# Patient Record
Sex: MALE | Employment: FULL TIME | URBAN - METROPOLITAN AREA
[De-identification: names, ages, dates, MRNs, and addresses within clinical notes are randomized per-mention and may not be internally consistent; named-entity substitution may affect disease eponyms.]

---

## 2017-01-17 ENCOUNTER — OFFICE VISIT (OUTPATIENT)
Dept: NEUROLOGY | Facility: CLINIC | Age: 37
End: 2017-01-17

## 2017-01-17 VITALS
WEIGHT: 215 LBS | DIASTOLIC BLOOD PRESSURE: 76 MMHG | SYSTOLIC BLOOD PRESSURE: 118 MMHG | HEIGHT: 72 IN | HEART RATE: 69 BPM | BODY MASS INDEX: 29.12 KG/M2

## 2017-01-17 DIAGNOSIS — G40.309 GENERALIZED CONVULSIVE EPILEPSY (H): ICD-10-CM

## 2017-01-17 DIAGNOSIS — G40.309 EPILEPTIC SEIZURE, GENERALIZED, CONVULSIVE (H): Primary | ICD-10-CM

## 2017-01-17 RX ORDER — LEVETIRACETAM 500 MG/1
1000 TABLET ORAL 2 TIMES DAILY
Qty: 360 TABLET | Refills: 3 | Status: SHIPPED | OUTPATIENT
Start: 2017-01-17 | End: 2018-01-29

## 2017-01-17 ASSESSMENT — ENCOUNTER SYMPTOMS
TROUBLE SWALLOWING: 0
SINUS CONGESTION: 1
NECK MASS: 0
SINUS PAIN: 1
SORE THROAT: 1
SMELL DISTURBANCE: 0
TASTE DISTURBANCE: 0
HOARSE VOICE: 0

## 2017-01-17 ASSESSMENT — PAIN SCALES - GENERAL: PAINLEVEL: NO PAIN (0)

## 2017-01-17 NOTE — MR AVS SNAPSHOT
After Visit Summary   1/17/2017    Sae Yip    MRN: [id number]           Patient Information     Date Of Birth          1980        Visit Information        Provider Department      1/17/2017 8:00 AM Zia Chiu MD [facility] Neurology        Today's Diagnoses     Epileptic seizure, generalized, convulsive (H)    -  1     Generalized convulsive epilepsy (H)            Follow-ups after your visit        Follow-up notes from your care team     Return in about 1 year (around 1/17/2018).      Your next 10 appointments already scheduled     Jan 17, 2017  9:30 AM   LAB with  LAB   [facility] Lab ([facility])    [address] [place] [address] [id number] [phone]           Patient must bring picture ID.  Patient should be prepared to give a urine specimen  Please do not eat 10-12 hours before your appointment if you are coming in fasting for labs on lipids, cholesterol, or glucose (sugar).  Pregnant women should follow their Care Team instructions. Water with medications is okay. Do not drink coffee or other fluids.   If you have concerns about taking  your medications, please ask at office or if scheduling via [employer], send a message by clicking on Secure Messaging, Message Your Care Team.            Jan 16, 2018  8:00 AM   (Arrive by 7:45 AM)   Return Seizure with Zia Chiu MD   [facility] Neurology ([facility])    [address] [place] [id number] [phone]              Future tests that were ordered for you today     Open Future Orders        Priority Expected Expires Ordered    Levetiracetam level Routine  1/17/2018 1/17/2017            Who to contact     Please call your clinic at [phone] to:    Ask questions about your health    Make or cancel appointments    Discuss your medicines    Learn about your test results    Speak to your doctor   If you have compliments or  concerns about an experience at your clinic, or if you wish to file a complaint, please contact [place] [facility] Patient Relations at [phone] or email us at [email or web address]         Additional Information About Your Visit        [employer] Information     [employer] is an electronic gateway that provides easy, online access to your medical records. With [employer], you can request a clinic appointment, read your test results, renew a prescription or communicate with your care team.     To sign up for [employer] visit the website at [email or web address]   You will be asked to enter the access code listed below, as well as some personal information. Please follow the directions to create your username and password.     Your access code is: [email or web address]  Expires: 4/3/2017  6:30 AM     Your access code will  in 90 days. If you need help or a new code, please contact your [facility] or call [phone] for assistance.        Care EveryWhere ID     This is your Care EveryWhere ID. This could be used by other organizations to access your [facility] medical records  [id number]        Your Vitals Were     Pulse Height BMI (Body Mass Index)             69 1.829 m (6') 29.15 kg/m2          Blood Pressure from Last 3 Encounters:   17 118/76   01/13/15 126/76   14 108/63    Weight from Last 3 Encounters:   17 97.523 kg (215 lb)   01/13/15 95.255 kg (210 lb)   14 92.534 kg (204 lb)                 Where to get your medicines      These medications were sent to [facility] [id number] [facility] [place] - [address]  [address] [address]     Phone:  [phone]    - levETIRAcetam 500 MG tablet       Primary Care Provider    Physician No Ref-Primary       No address on file        Thank you!     Thank you for choosing [facility] NEUROLOGY  for your care. Our goal is always to provide you with excellent care. Hearing back from our  patients is one way we can continue to improve our services. Please take a few minutes to complete the written survey that you may receive in the mail after your visit with us. Thank you!             Your Updated Medication List - Protect others around you: Learn how to safely use, store and throw away your medicines at www.disposemymeds.org.          This list is accurate as of: 1/17/17  9:27 AM.  Always use your most recent med list.                   Brand Name Dispense Instructions for use    levETIRAcetam 500 MG tablet    KEPPRA    360 tablet    Take 2 tablets (1,000 mg) by mouth 2 times daily

## 2017-01-17 NOTE — PROGRESS NOTES
[facility] Neurology Epilepsy Clinic: RETURN VISIT     HISTORY: Mr. 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  He came alone to the visit today.      The patient reported that he has not had any type of seizure or jerks while awake, following his most recent visit to this clinic on 01/21/2016.  The most recent seizure was a grand mal seizure in 2013.  He had an electroencephalogram in 2016, which continued to show generalized spike-wave discharges, and today he remembered or telephone discussion of this.  He denied having any adverse effects of levetiracetam.      Ictal semiology-history:   The patient re-confirmed previously presented history today.  Previously he reported that his most recent seizure occurred in [place] on March 30, 2013, when he had been drinking alcohol and having reduced sleep for several days prior to the seizure event which occurred on Saturday in the afternoon at approximately 4:00 p.m.  The seizure was witnessed by several of his friends in the hotel room where they described that he had a loud grunting sound produced followed by a generalized tonic-clonic convulsion which appeared to last for approximately 1-2 minutes followed by a several hour postictal state of confusion and lethargy. The patient had bitten his tongue on the left lower side. There was no urine incontinence. He was taken to an emergency room in [place] where he underwent a CAT scan which was normal and was discharged on Keppra 500 mg twice daily.     As per his history the patient states that at the age of 21 he experienced a similar event associated with the same surrounding circumstances such as drinking alcohol and having a lack of sleep. 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 The patient states that he was totally alert and conscious during this period but felt that his perception of events were somewhat altered. He states that these were his only 2 generalized tonic-clonic convulsions that he has had throughout his life. He denies ever having convulsions prior to these 2.     Epilepsy-seizure predispositions:   He denied any history of childhood febrile seizures, denies any history of childhood myoclonic jerking activity, particularly in the morning hours or the evening hours prior to going to sleep. He denied any history of absence episodes or staring episodes or difficulties with school as a child.     The patient has no family history of epilepsy or seizures, except for a great aunt who did have epilepsy; he does not know details of her seizure history.     Developmentally he hit his milestones at appropriate times during his childhood.  He has no history of gestational or  injury, febrile convulsions, developmental delay, stroke, meningitis, encephalitis, significant head injury, or other epileptic predispositions. He denied a history of physical or sexual abuse by an adult during his childhood or adulthood.     He does state that for approximately 6 months to 1 year after having his first generalized tonic-clonic convulsion at the age of 21 that he had had these periods lasting anywhere from 4-6 hours of confusion and \"not having mind work correctly\". He cannot describe the details of these events and he denies ever having loss of consciousness with any of these events, but states that when asked to perform complex tasks during this time period he just is unable to do so. He gives references to knowing that a task should be performed at a certain time and realizing that this task was not " performed at that time and unclear as to why he was not able to do that.     Laboratory evaluations:   He previously reported that when he was 21 years old he did have a neurologic evaluation which included an MRI scan as well as an EEG which did reveal abnormalities on the EEG which appeared to be described as possible left temporal sharp wave discharges, which were reported as  not definitive . His MRI scan was reported to be normal, although these records are unavailable for review at this time.     On April 8, 2013, at [facility], a brain MRI was abnormal due to a left frontal lobe subcortical white matter abnormality c/w gliosis.     On May 30, 2013, at [facility], a 24-hour ambulatory EEG recording showed frequent 1-6 second discharges of 3 Hz generalized spike-wave activity, mainly in drowsiness, with no seizures.     On February16, 2016, at [facility], a prolonged outpatient EEG recording showed occasional brief bursts of generalized atypical spike-wave discharges, mainly during slow wave sleep.     Epilepsy therapeutics:   His only epilepsy therapy has been levetiracetam.    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 He lives with his wife and their daughter. He uses alcohol as 1-2 drinks 1-2 times per week. He denies using illicit drugs.     REVIEW OF SYSTEMS: The patient denied history of attention and memory disturbance, difficulties with comprehension and expression, difficulty in solving problems, weakness, tremors or other abnormal involuntary movements, numbness or tingling, incoordination, falling or difficulty in walking, urinary or fecal incontinence, headache and other pain, except as described above. The patient denied any history of severe depression or suicidal ideation, severe anxiety, panic attacks, hallucinations,  delusions, psychosis, substance abuse, or psychiatric hospitalization. He denied rashes and easy bruising. The remainder of a 10-system symptom review was negative.     MEDICATIONS: Levetiracetam 1000 mg b.i.d.     ALLERGIES: The patient denied any known medication allergies.     PHYSICAL EXAMINATION:   On physical examination the patient appeared to be in no acute distress.  Vital signs were as per the electronic medical record.  Skull was normocephalic and atraumatic.  Neck was supple, without signs of meningeal irritation.      On neurological examination the patient appeared alert and was fully oriented to person, place, time, and reason for visit.  Speech showed grossly normal articulation, fluency, repetitions, naming, syntax and comprehension.  No apraxias or atavistic signs were elicited.  Cranial nerves III through XII were normal.  Muscle masses, tones and strengths were normal throughout.  There was no pronator drift.  No tremors, myoclonus, or other abnormal movements were observed.  Sensations of light touch, pin prick, vibration and proprioception were reportedly normal throughout.  The rapid alternating movements, and finger-nose-finger and heel-shin maneuvers were performed normally bilaterally.  Deep tendon reflexes were normal and symmetric throughout.  Toes were downgoing bilaterally.  Romberg maneuver was negative.  Regular, tandem and reverse tandem walking were normal.      IMPRESSION:   The patient has evidence of idiopathic generalized epilepsy by history and electroencephalography.  Although he has not had a grand mal seizure for nearly 4 years, his EEG of approximately 10 months ago continued to show brief bursts of generalized atypical spike-wave discharges.  We discussed the ongoing high risk of seizure recurrence should he stop anti-seizure medication.  He has no interest in doing so.     I again reviewed [place] regulations on seizures and driving with the patient. He appeared to  clearly understand that he would be prohibited from operating a motor vehicle within 3 months following any future seizure or other episode with sudden unconsciousness or inability to sit up, and that he is required to report any future such seizure to the [facility] within 30 days after the event. I also recommended that he and his family review all of his other activities, and avoid any activities that might lead to self-injury or injury of others, within 6 months following any seizure with impaired awareness or impaired motor control. Such activities include but are not limited to holding babies or young children at heights from which they might be injured if dropped, bathing infants or young children in situations in which they might drown without continuous interactive care by an adult who is fully capable at all times during the bath, operating power cutting or other tools, handling firearms, exposure to heights from which he might fall, exposure to vessels with hot cooking oil or water, and swimming alone.     PLAN:   1.  Serum levetiracetam level today.   2.  Continue levetiracetam at 1000 mg b.i.d.   3.  Return visit in approximately 1 year.   I spent 25 minutes in this patient care, more than 50% of which consisted of counseling and coordinating care.       Zia Chiu M.D.   Professor of Neurology    D: 2017 09:32   T: 2017 12:33   MT: DANAY      Name:     TIFFANIE MENEZES   MRN:      [id number]        Account:      [id number]   :      1980           Service Date: 2017      Document: [id number]

## 2017-01-17 NOTE — Clinical Note
1/17/2017       RE: Sae Yip  [address] [facility] [address]     Dear Colleague,    Thank you for referring your patient, Sae Yip, to the [facility] NEUROLOGY at [facility]. Please see a copy of my visit note below.      [facility] Neurology Epilepsy Clinic: RETURN VISIT     HISTORY: Mr. 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  He came alone to the visit today.      The patient reported that he has not had any type of seizure or jerks while awake, following his most recent visit to this clinic on 01/21/2016.  The most recent seizure was a grand mal seizure in 2013.  He had an electroencephalogram in 2016, which continued to show generalized spike-wave discharges, and today he remembered or telephone discussion of this.  He denied having any adverse effects of levetiracetam.      Ictal semiology-history:   The patient re-confirmed previously presented history today.  Previously he reported that his most recent seizure occurred in [place] on March 30, 2013, when he had been drinking alcohol and having reduced sleep for several days prior to the seizure event which occurred on Saturday in the afternoon at approximately 4:00 p.m.  The seizure was witnessed by several of his friends in the hotel room where they described that he had a loud grunting sound produced followed by a generalized tonic-clonic convulsion which appeared to last for approximately 1-2 minutes followed by a several hour postictal state of confusion and lethargy. The patient had bitten his tongue on the left lower side. There was no urine incontinence. He was taken to an emergency room in [place] where he underwent a CAT scan which was normal and was discharged on Keppra 500 mg twice daily.     As per his history the patient states that at the age of 21 he experienced a similar event  "associated with the same surrounding circumstances such as drinking alcohol and having a lack of sleep. 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 The patient states that he was totally alert and conscious during this period but felt that his perception of events were somewhat altered. He states that these were his only 2 generalized tonic-clonic convulsions that he has had throughout his life. He denies ever having convulsions prior to these 2.     Epilepsy-seizure predispositions:   He denied any history of childhood febrile seizures, denies any history of childhood myoclonic jerking activity, particularly in the morning hours or the evening hours prior to going to sleep. He denied any history of absence episodes or staring episodes or difficulties with school as a child.     The patient has no family history of epilepsy or seizures, except for a great aunt who did have epilepsy; he does not know details of her seizure history.     Developmentally he hit his milestones at appropriate times during his childhood.  He has no history of gestational or  injury, febrile convulsions, developmental delay, stroke, meningitis, encephalitis, significant head injury, or other epileptic predispositions. He denied a history of physical or sexual abuse by an adult during his childhood or adulthood.     He does state that for approximately 6 months to 1 year after having his first generalized tonic-clonic convulsion at the age of 21 that he had had these periods lasting anywhere from 4-6 hours of confusion and \"not having mind work correctly\". He cannot describe the details of these events and he denies " ever having loss of consciousness with any of these events, but states that when asked to perform complex tasks during this time period he just is unable to do so. He gives references to knowing that a task should be performed at a certain time and realizing that this task was not performed at that time and unclear as to why he was not able to do that.     Laboratory evaluations:   He previously reported that when he was 21 years old he did have a neurologic evaluation which included an MRI scan as well as an EEG which did reveal abnormalities on the EEG which appeared to be described as possible left temporal sharp wave discharges, which were reported as  not definitive . His MRI scan was reported to be normal, although these records are unavailable for review at this time.     On April 8, 2013, at [facility], a brain MRI was abnormal due to a left frontal lobe subcortical white matter abnormality c/w gliosis.     On May 30, 2013, at [facility], a 24-hour ambulatory EEG recording showed frequent 1-6 second discharges of 3 Hz generalized spike-wave activity, mainly in drowsiness, with no seizures.     On February16, 2016, at [facility], a prolonged outpatient EEG recording showed occasional brief bursts of generalized atypical spike-wave discharges, mainly during slow wave sleep.     Epilepsy therapeutics:   His only epilepsy therapy has been levetiracetam.    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 He lives with his wife and their daughter. He uses alcohol as 1-2 drinks 1-2 times per week. He denies using illicit drugs.     REVIEW OF SYSTEMS: The patient denied history of attention and memory disturbance, difficulties with comprehension and expression, difficulty in solving problems, weakness, tremors or other abnormal involuntary movements,  numbness or tingling, incoordination, falling or difficulty in walking, urinary or fecal incontinence, headache and other pain, except as described above. The patient denied any history of severe depression or suicidal ideation, severe anxiety, panic attacks, hallucinations, delusions, psychosis, substance abuse, or psychiatric hospitalization. He denied rashes and easy bruising. The remainder of a 10-system symptom review was negative.     MEDICATIONS: Levetiracetam 1000 mg b.i.d.     ALLERGIES: The patient denied any known medication allergies.     PHYSICAL EXAMINATION:   On physical examination the patient appeared to be in no acute distress.  Vital signs were as per the electronic medical record.  Skull was normocephalic and atraumatic.  Neck was supple, without signs of meningeal irritation.      On neurological examination the patient appeared alert and was fully oriented to person, place, time, and reason for visit.  Speech showed grossly normal articulation, fluency, repetitions, naming, syntax and comprehension.  No apraxias or atavistic signs were elicited.  Cranial nerves III through XII were normal.  Muscle masses, tones and strengths were normal throughout.  There was no pronator drift.  No tremors, myoclonus, or other abnormal movements were observed.  Sensations of light touch, pin prick, vibration and proprioception were reportedly normal throughout.  The rapid alternating movements, and finger-nose-finger and heel-shin maneuvers were performed normally bilaterally.  Deep tendon reflexes were normal and symmetric throughout.  Toes were downgoing bilaterally.  Romberg maneuver was negative.  Regular, tandem and reverse tandem walking were normal.      IMPRESSION:   The patient has evidence of idiopathic generalized epilepsy by history and electroencephalography.  Although he has not had a grand mal seizure for nearly 4 years, his EEG of approximately 10 months ago continued to show brief bursts of  generalized atypical spike-wave discharges.  We discussed the ongoing high risk of seizure recurrence should he stop anti-seizure medication.  He has no interest in doing so.     I again reviewed [place] regulations on seizures and driving with the patient. He appeared to clearly understand that he would be prohibited from operating a motor vehicle within 3 months following any future seizure or other episode with sudden unconsciousness or inability to sit up, and that he is required to report any future such seizure to the [facility] within 30 days after the event. I also recommended that he and his family review all of his other activities, and avoid any activities that might lead to self-injury or injury of others, within 6 months following any seizure with impaired awareness or impaired motor control. Such activities include but are not limited to holding babies or young children at heights from which they might be injured if dropped, bathing infants or young children in situations in which they might drown without continuous interactive care by an adult who is fully capable at all times during the bath, operating power cutting or other tools, handling firearms, exposure to heights from which he might fall, exposure to vessels with hot cooking oil or water, and swimming alone.     PLAN:   1.  Serum levetiracetam level today.   2.  Continue levetiracetam at 1000 mg b.i.d.   3.  Return visit in approximately 1 year.   I spent 25 minutes in this patient care, more than 50% of which consisted of counseling and coordinating care.       Zia Chiu M.D.   Professor of Neurology    D: 2017 09:32   T: 2017 12:33   MT: DANAY      Name:     TIFFANIE MENEZES   MRN:      [id number]        Account:      [id number]   :      1980           Service Date: 2017      Document: [id number]

## 2017-01-17 NOTE — NURSING NOTE
Chief Complaint   Patient presents with     RECHECK     [facility] RETURN SEIZURE     Maria Elena Oneill MA

## 2017-08-03 ENCOUNTER — TELEPHONE (OUTPATIENT)
Dept: NEUROLOGY | Facility: CLINIC | Age: 37
End: 2017-08-03

## 2017-08-03 NOTE — TELEPHONE ENCOUNTER
DMV form signed, faxed to [employer] on 8/3/17, sent to scanning, and copy mailed to patient.     Tawny Reis CMA

## 2018-01-29 ENCOUNTER — OFFICE VISIT (OUTPATIENT)
Dept: NEUROLOGY | Facility: CLINIC | Age: 38
End: 2018-01-29
Payer: COMMERCIAL

## 2018-01-29 ENCOUNTER — APPOINTMENT (OUTPATIENT)
Dept: LAB | Facility: CLINIC | Age: 38
End: 2018-01-29
Payer: COMMERCIAL

## 2018-01-29 VITALS
DIASTOLIC BLOOD PRESSURE: 52 MMHG | SYSTOLIC BLOOD PRESSURE: 132 MMHG | OXYGEN SATURATION: 99 % | WEIGHT: 212.7 LBS | HEART RATE: 55 BPM | HEIGHT: 71 IN | RESPIRATION RATE: 20 BRPM | BODY MASS INDEX: 29.78 KG/M2 | TEMPERATURE: 98.1 F

## 2018-01-29 DIAGNOSIS — G40.309 GENERALIZED CONVULSIVE EPILEPSY (H): ICD-10-CM

## 2018-01-29 DIAGNOSIS — G40.309 EPILEPTIC SEIZURE, GENERALIZED, CONVULSIVE (H): Primary | ICD-10-CM

## 2018-01-29 RX ORDER — LEVETIRACETAM 500 MG/1
1000 TABLET ORAL 2 TIMES DAILY
Qty: 360 TABLET | Refills: 3 | Status: SHIPPED | OUTPATIENT
Start: 2018-01-29 | End: 2018-10-01

## 2018-01-29 ASSESSMENT — PAIN SCALES - GENERAL: PAINLEVEL: NO PAIN (0)

## 2018-01-29 NOTE — PROGRESS NOTES
[facility] Neurology Epilepsy Clinic: RETURN VISIT     HISTORY: Mr. 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  He came to the visit today alone.      The patient reported that following the most recent visit to this clinic on 01/17/2017, he has not had any grand mal seizures or any sort of event with falling or loss of consciousness.  He has had some hypnic jerks when he is clearly drowsy, but he has not had any jerks while awake.  He denied having adverse effects of levetiracetam.      Ictal semiology-history:   The patient re-confirmed previously presented history today.  Previously he reported that his most recent seizure occurred in [place] on March 30, 2013, when he had been drinking alcohol and having reduced sleep for several days prior to the seizure event which occurred on Saturday in the afternoon at approximately 4:00 p.m.  The seizure was witnessed by several of his friends in the hotel room where they described that he had a loud grunting sound produced followed by a generalized tonic-clonic convulsion which appeared to last for approximately 1-2 minutes followed by a several hour postictal state of confusion and lethargy. The patient had bitten his tongue on the left lower side. There was no urine incontinence. He was taken to an emergency room in [place] where he underwent a CAT scan which was normal and was discharged on Keppra 500 mg twice daily.     As per his history the patient states that at the age of 21 he experienced a similar event associated with the same surrounding circumstances such as drinking alcohol and having a lack of sleep. 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 The patient states that he was totally alert and conscious during this period but felt that his perception of events were somewhat altered. He states that these were his only 2 generalized tonic-clonic convulsions that he has had throughout his life. He denies ever having convulsions prior to these 2.     Epilepsy-seizure predispositions:   He denied any history of childhood febrile seizures, denies any history of childhood myoclonic jerking activity, particularly in the morning hours or the evening hours prior to going to sleep. He denied any history of absence episodes or staring episodes or difficulties with school as a child.     The patient has no family history of epilepsy or seizures, except for a great aunt who did have epilepsy; he does not know details of her seizure history.     Developmentally he hit his milestones at appropriate times during his childhood.  He has no history of gestational or  injury, febrile convulsions, developmental delay, stroke, meningitis, encephalitis, significant head injury, or other epileptic predispositions. He denied a history of physical or sexual abuse by an adult during his childhood or adulthood.     He does state that for approximately 6 months to 1 year after having his first generalized tonic-clonic convulsion at the age of 21 that he had had these periods lasting anywhere from 4-6 hours of confusion and \"not having mind work correctly\". He cannot describe the details of these events and he denies ever having loss of consciousness with any of these events, but states that when asked to perform complex tasks during this time period he just is unable to do so. He gives references to knowing that a task should be performed at a certain time and realizing that this task was not performed at that time and unclear as to why he was not able to do that. "     Laboratory evaluations:   He previously reported that when he was 21 years old he did have a neurologic evaluation which included an MRI scan as well as an EEG which did reveal abnormalities on the EEG which appeared to be described as possible left temporal sharp wave discharges, which were reported as  not definitive . His MRI scan was reported to be normal, although these records are unavailable for review at this time.     On April 8, 2013, at [facility], a brain MRI was abnormal due to a left frontal lobe subcortical white matter abnormality c/w gliosis.     On May 30, 2013, at [facility], a 24-hour ambulatory EEG recording showed frequent 1-6 second discharges of 3 Hz generalized spike-wave activity, mainly in drowsiness, with no seizures.     On February16, 2016, at [facility], a prolonged outpatient EEG recording showed occasional brief bursts of generalized atypical spike-wave discharges, mainly during slow wave sleep.     Epilepsy therapeutics:   His only epilepsy therapy has been levetiracetam.    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 He lives with his wife and their daughter. He uses alcohol as 1-2 drinks 1-2 times per week. He denies using illicit drugs.     REVIEW OF SYSTEMS: The patient denied history of attention and memory disturbance, difficulties with comprehension and expression, difficulty in solving problems, weakness, tremors or other abnormal involuntary movements, numbness or tingling, incoordination, falling or difficulty in walking, urinary or fecal incontinence, headache and other pain, except as described above. The patient denied any history of severe depression or suicidal ideation, severe anxiety, panic attacks, hallucinations, delusions, psychosis, substance abuse, or psychiatric hospitalization. He  "denied rashes and easy bruising. The remainder of a 10-system symptom review was negative.     MEDICATIONS: Levetiracetam 1000 mg b.i.d.     ALLERGIES: The patient denied any known medication allergies.     PHYSICAL EXAMINATION:   On physical examination the patient appeared to be in no acute distress.  Vital signs were as per the electronic medical record.  Skull was normocephalic and atraumatic.  Neck was supple, without signs of meningeal irritation.      On neurological examination the patient appeared alert and was fully oriented to person, place, time, and reason for visit.  Speech showed grossly normal articulation, fluency, repetitions, naming, syntax and comprehension.  No apraxias or atavistic signs were elicited.  Cranial nerves III through XII were normal.  Muscle masses, tones and strengths were normal throughout.  There was no pronator drift.  No tremors, myoclonus, or other abnormal movements were observed.  Sensations of light touch, pin prick, vibration and proprioception were reportedly normal throughout.  The rapid alternating movements, and finger-nose-finger and heel-shin maneuvers were performed normally bilaterally.  Deep tendon reflexes were normal and symmetric throughout.  Toes were downgoing bilaterally.  Romberg maneuver was negative.  Regular, tandem and reverse tandem walking were normal.      IMPRESSION:   The patient remains seizure-free on levetiracetam monotherapy.  We will check a serum level today and also an outpatient EEG and adjust dosing as needed to maintain a markedly reduced risk of seizure recurrence.      I discussed in detail strategies to be certain that he does not miss doses of levetiracetam, as he has  a difficult travel schedule in his work and he thinks that he may \"occasionally\" miss a dose of levetiracetam.     I once again reviewed [place] regulations on seizures and driving with the patient. He appeared to clearly understand that he would be prohibited from " operating a motor vehicle within 3 months following any future seizure or other episode with sudden unconsciousness or inability to sit up, and that he is required to report any future such seizure to the V within 30 days after the event. I also recommended that he and his family review all of his other activities, and avoid any activities that might lead to self-injury or injury of others, within 6 months following any seizure with impaired awareness or impaired motor control. Such activities include but are not limited to holding babies or young children at heights from which they might be injured if dropped, bathing infants or young children in situations in which they might drown without continuous interactive care by an adult who is fully capable at all times during the bath, operating power cutting or other tools, handling firearms, exposure to heights from which he might fall, exposure to vessels with hot cooking oil or water, and swimming alone.     PLAN:   1.  Check serum levetiracetam level today.   2.  Continue levetiracetam at 1000 mg b.i.d.   3.  Outpatient 3-hour video EEG.   4.  Return visit in approximately 11 months.     I spent 25 minutes in this patient care, more than 50% of which consisted of counseling and coordinating care.       Zia Chiu M.D.   Professor of Neurology        D: 2018   T: 2018   MT: nh      Name:     TIFFANIE MENEZES   MRN:      [id number]        Account:      [id number]   :      1980           Service Date: 2018      Document: [id number]

## 2018-01-29 NOTE — MR AVS SNAPSHOT
After Visit Summary   1/29/2018    Sae Yip    MRN: [id number]           Patient Information     Date Of Birth          1980        Visit Information        Provider Department      1/29/2018 2:30 PM Zia Chiu MD [facility] Neurology        Today's Diagnoses     Epileptic seizure, generalized, convulsive (H)    -  1    Generalized convulsive epilepsy (H)           Follow-ups after your visit        Follow-up notes from your care team     Return in about 11 months (around 12/29/2018).      Your next 10 appointments already scheduled     Jan 29, 2018  3:45 PM CST   LAB with  LAB   [facility] ([facility])    [address] [place] [address] [id number] [phone]           Please do not eat 10-12 hours before your appointment if you are coming in fasting for labs on lipids, cholesterol, or glucose (sugar). This does not apply to pregnant women. Water, hot tea and black coffee (with nothing added) are okay. Do not drink other fluids, diet soda or chew gum.            Feb 21, 2018  8:30 AM CST   (Arrive by 8:15 AM)   In Lab Video Visit with  EEG TECH 2   [facility] ([facility])    [address] [place] [address] [id number] [phone]            Dec 17, 2018  2:30 PM CST   (Arrive by 2:15 PM)   Return Seizure with Zia Chiu MD   [facility] ([facility])    [address] [place] [address] [id number] [phone]              Future tests that were ordered for you today     Open Future Orders        Priority Expected Expires Ordered    EEG video monitoring Routine 1/30/2018 1/29/2019 1/29/2018            Who to contact     Please call your clinic at [phone] to:    Ask questions about your health    Make or cancel appointments    Discuss your medicines    Learn about your test results    Speak to your doctor   If you have  "compliments or concerns about an experience at your clinic, or if you wish to file a complaint, please contact [facility] Patient Relations at [phone] or email us at [email or web address]         Additional Information About Your Visit        [employer] Information     [employer] is an electronic gateway that provides easy, online access to your medical records. With [employer], you can request a clinic appointment, read your test results, renew a prescription or communicate with your care team.     To sign up for [employer] visit the website at [email or web address]   You will be asked to enter the access code listed below, as well as some personal information. Please follow the directions to create your username and password.     Your access code is: [email or web address]  Expires: 4/15/2018  6:30 AM     Your access code will  in 90 days. If you need help or a new code, please contact your [facility] or call [phone] for assistance.        Care EveryWhere ID     This is your Care EveryWhere ID. This could be used by other organizations to access your [facility] medical records  [id number]        Your Vitals Were     Pulse Temperature Respirations Height Pulse Oximetry BMI (Body Mass Index)    55 98.1  F (36.7  C) 20 1.803 m (5' 11\") 99% 29.67 kg/m2       Blood Pressure from Last 3 Encounters:   18 132/52   17 118/76   01/13/15 126/76    Weight from Last 3 Encounters:   18 96.5 kg (212 lb 11.2 oz)   17 97.5 kg (215 lb)   01/13/15 95.3 kg (210 lb)              We Performed the Following     Keppra (Levetiracetam) Level          Where to get your medicines      These medications were sent to [facility] [id number] [place] [facility] [place] - [address]  [address] [address]     Phone:  [phone]     levETIRAcetam 500 MG tablet          Primary Care Provider Fax #    Physician No Ref-Primary [phone]       No address on " file        Equal Access to Services     [facility] : Hadii aad ku hadwallaceshae Sybilali, washannanda luangieshannonha, qamelissaloretta keanelewisnicholas tom. So [facility] [phone].    ATENCIÓN: Si habla español, tiene a jimenez disposición servicios gratuitos de asistencia lingüística. Llame al [phone].    We comply with applicable federal civil rights laws and [place] laws. We do not discriminate on the basis of race, color, national origin, age, disability, sex, sexual orientation, or gender identity.            Thank you!     Thank you for choosing [facility]  for your care. Our goal is always to provide you with excellent care. Hearing back from our patients is one way we can continue to improve our services. Please take a few minutes to complete the written survey that you may receive in the mail after your visit with us. Thank you!             Your Updated Medication List - Protect others around you: Learn how to safely use, store and throw away your medicines at www.disposemymeds.org.          This list is accurate as of 1/29/18  3:40 PM.  Always use your most recent med list.                   Brand Name Dispense Instructions for use Diagnosis    levETIRAcetam 500 MG tablet    KEPPRA    360 tablet    Take 2 tablets (1,000 mg) by mouth 2 times daily    Generalized convulsive epilepsy (H)

## 2018-01-29 NOTE — NURSING NOTE
Chief Complaint   Patient presents with     RECHECK     [facility]- SEIZURES F/U     Gerardo Weaver, CMA

## 2018-01-29 NOTE — LETTER
1/29/2018       RE: Sae Yip  [address] [facility] [address]     Dear Colleague,    Thank you for referring your patient, Sae Yip, to the [facility] NEUROLOGY at [facility] acute hospital. Please see a copy of my visit note below.      [facility] Neurology Epilepsy Clinic: RETURN VISIT     HISTORY: Mr. 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  He came to the visit today alone.      The patient reported that following the most recent visit to this clinic on 01/17/2017, he has not had any grand mal seizures or any sort of event with falling or loss of consciousness.  He has had some hypnic jerks when he is clearly drowsy, but he has not had any jerks while awake.  He denied having adverse effects of levetiracetam.      Ictal semiology-history:   The patient re-confirmed previously presented history today.  Previously he reported that his most recent seizure occurred in [place] on March 30, 2013, when he had been drinking alcohol and having reduced sleep for several days prior to the seizure event which occurred on Saturday in the afternoon at approximately 4:00 p.m.  The seizure was witnessed by several of his friends in the hotel room where they described that he had a loud grunting sound produced followed by a generalized tonic-clonic convulsion which appeared to last for approximately 1-2 minutes followed by a several hour postictal state of confusion and lethargy. The patient had bitten his tongue on the left lower side. There was no urine incontinence. He was taken to an emergency room in [place] where he underwent a CAT scan which was normal and was discharged on Keppra 500 mg twice daily.     As per his history the patient states that at the age of 21 he experienced a similar event associated with the same surrounding circumstances such as drinking alcohol and  "having a lack of sleep. 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 The patient states that he was totally alert and conscious during this period but felt that his perception of events were somewhat altered. He states that these were his only 2 generalized tonic-clonic convulsions that he has had throughout his life. He denies ever having convulsions prior to these 2.     Epilepsy-seizure predispositions:   He denied any history of childhood febrile seizures, denies any history of childhood myoclonic jerking activity, particularly in the morning hours or the evening hours prior to going to sleep. He denied any history of absence episodes or staring episodes or difficulties with school as a child.     The patient has no family history of epilepsy or seizures, except for a great aunt who did have epilepsy; he does not know details of her seizure history.     Developmentally he hit his milestones at appropriate times during his childhood.  He has no history of gestational or  injury, febrile convulsions, developmental delay, stroke, meningitis, encephalitis, significant head injury, or other epileptic predispositions. He denied a history of physical or sexual abuse by an adult during his childhood or adulthood.     He does state that for approximately 6 months to 1 year after having his first generalized tonic-clonic convulsion at the age of 21 that he had had these periods lasting anywhere from 4-6 hours of confusion and \"not having mind work correctly\". He cannot describe the details of these events and he denies ever having loss of consciousness with any of these events, but states that when " asked to perform complex tasks during this time period he just is unable to do so. He gives references to knowing that a task should be performed at a certain time and realizing that this task was not performed at that time and unclear as to why he was not able to do that.     Laboratory evaluations:   He previously reported that when he was 21 years old he did have a neurologic evaluation which included an MRI scan as well as an EEG which did reveal abnormalities on the EEG which appeared to be described as possible left temporal sharp wave discharges, which were reported as  not definitive . His MRI scan was reported to be normal, although these records are unavailable for review at this time.     On April 8, 2013, at [facility], a brain MRI was abnormal due to a left frontal lobe subcortical white matter abnormality c/w gliosis.     On May 30, 2013, at [facility], a 24-hour ambulatory EEG recording showed frequent 1-6 second discharges of 3 Hz generalized spike-wave activity, mainly in drowsiness, with no seizures.     On February16, 2016, at [facility], a prolonged outpatient EEG recording showed occasional brief bursts of generalized atypical spike-wave discharges, mainly during slow wave sleep.     Epilepsy therapeutics:   His only epilepsy therapy has been levetiracetam.    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 He lives with his wife and their daughter. He uses alcohol as 1-2 drinks 1-2 times per week. He denies using illicit drugs.     REVIEW OF SYSTEMS: The patient denied history of attention and memory disturbance, difficulties with comprehension and expression, difficulty in solving problems, weakness, tremors or other abnormal involuntary movements, numbness or tingling, incoordination, falling or difficulty in walking, urinary  or fecal incontinence, headache and other pain, except as described above. The patient denied any history of severe depression or suicidal ideation, severe anxiety, panic attacks, hallucinations, delusions, psychosis, substance abuse, or psychiatric hospitalization. He denied rashes and easy bruising. The remainder of a 10-system symptom review was negative.     MEDICATIONS: Levetiracetam 1000 mg b.i.d.     ALLERGIES: The patient denied any known medication allergies.     PHYSICAL EXAMINATION:   On physical examination the patient appeared to be in no acute distress.  Vital signs were as per the electronic medical record.  Skull was normocephalic and atraumatic.  Neck was supple, without signs of meningeal irritation.      On neurological examination the patient appeared alert and was fully oriented to person, place, time, and reason for visit.  Speech showed grossly normal articulation, fluency, repetitions, naming, syntax and comprehension.  No apraxias or atavistic signs were elicited.  Cranial nerves III through XII were normal.  Muscle masses, tones and strengths were normal throughout.  There was no pronator drift.  No tremors, myoclonus, or other abnormal movements were observed.  Sensations of light touch, pin prick, vibration and proprioception were reportedly normal throughout.  The rapid alternating movements, and finger-nose-finger and heel-shin maneuvers were performed normally bilaterally.  Deep tendon reflexes were normal and symmetric throughout.  Toes were downgoing bilaterally.  Romberg maneuver was negative.  Regular, tandem and reverse tandem walking were normal.      IMPRESSION:   The patient remains seizure-free on levetiracetam monotherapy.  We will check a serum level today and also an outpatient EEG and adjust dosing as needed to maintain a markedly reduced risk of seizure recurrence.      I discussed in detail strategies to be certain that he does not miss doses of levetiracetam, as he has   "a difficult travel schedule in his work and he thinks that he may \"occasionally\" miss a dose of levetiracetam.     I once again reviewed [place] regulations on seizures and driving with the patient. He appeared to clearly understand that he would be prohibited from operating a motor vehicle within 3 months following any future seizure or other episode with sudden unconsciousness or inability to sit up, and that he is required to report any future such seizure to the [facility] within 30 days after the event. I also recommended that he and his family review all of his other activities, and avoid any activities that might lead to self-injury or injury of others, within 6 months following any seizure with impaired awareness or impaired motor control. Such activities include but are not limited to holding babies or young children at heights from which they might be injured if dropped, bathing infants or young children in situations in which they might drown without continuous interactive care by an adult who is fully capable at all times during the bath, operating power cutting or other tools, handling firearms, exposure to heights from which he might fall, exposure to vessels with hot cooking oil or water, and swimming alone.     PLAN:   1.  Check serum levetiracetam level today.   2.  Continue levetiracetam at 1000 mg b.i.d.   3.  Outpatient 3-hour video EEG.   4.  Return visit in approximately 11 months.     I spent 25 minutes in this patient care, more than 50% of which consisted of counseling and coordinating care.       Zia Chiu M.D.    of Neurology        D: 2018   T: 2018   MT: nh      Name:     TIFFANIE MENEZES   MRN:      [id number]        Account:      [id number]   :      1980           Service Date: 2018      Document: [id number]                "

## 2018-01-31 LAB — LEVETIRACETAM SERPL-MCNC: 43 UG/ML (ref 12–46)

## 2018-02-21 ENCOUNTER — OFFICE VISIT (OUTPATIENT)
Dept: NEUROLOGY | Facility: CLINIC | Age: 38
End: 2018-02-21
Payer: COMMERCIAL

## 2018-02-21 DIAGNOSIS — G40.309 EPILEPTIC SEIZURE, GENERALIZED, CONVULSIVE (H): ICD-10-CM

## 2018-02-21 NOTE — MR AVS SNAPSHOT
After Visit Summary   2018    Sae Yip    MRN: [id number]           Patient Information     Date Of Birth          1980        Visit Information        Provider Department      2018 8:30 AM  EEG TECH 2 [facility] OUTPATIENT        Today's Diagnoses     Epileptic seizure, generalized, convulsive (H)           Follow-ups after your visit        Your next 10 appointments already scheduled     Dec 17, 2018  2:30 PM CST   (Arrive by 2:15 PM)   Return Seizure with Zia Chiu MD   [facility] Neurology ([facility])    [address] [place] [id number] [phone]              Who to contact     Please call your clinic at [phone] to:    Ask questions about your health    Make or cancel appointments    Discuss your medicines    Learn about your test results    Speak to your doctor            Additional Information About Your Visit        MyChart Information     REAL [employer]AIt is an electronic gateway that provides easy, online access to your medical records. With [employer], you can request a clinic appointment, read your test results, renew a prescription or communicate with your care team.     To sign up for [employer]URA[employer] visit the website at [email or web address]   You will be asked to enter the access code listed below, as well as some personal information. Please follow the directions to create your username and password.     Your access code is: [email or web address]  Expires: 4/15/2018  6:30 AM     Your access code will  in 90 days. If you need help or a new code, please contact your [facility] or call [phone] for assistance.        Care EveryWhere ID     This is your Care EveryWhere ID. This could be used by other organizations to access your [facility] medical records  [id number]         Blood Pressure from Last 3 Encounters:   No data found for BP    Weight from Last 3 Encounters:   No data  found for Wt              Today, you had the following     No orders found for display       Primary Care Provider Fax #    Physician No Ref-Primary [phone]       No address on file        Equal Access to Services     UMESH PATRICK : Annemarie aad ku hadann [place], olamide dalibj, kaveh carreon, nicholas orantestrinity jenae. So [facility] [phone].    ATENCIÓN: Si habla español, tiene a jimenez disposición servicios gratuitos de asistencia lingüística. Llame al [phone].    We comply with applicable federal civil rights laws and [place] laws. We do not discriminate on the basis of race, color, national origin, age, disability, sex, sexual orientation, or gender identity.            Thank you!     Thank you for choosing [facility]  for your care. Our goal is always to provide you with excellent care. Hearing back from our patients is one way we can continue to improve our services. Please take a few minutes to complete the written survey that you may receive in the mail after your visit with us. Thank you!             Your Updated Medication List - Protect others around you: Learn how to safely use, store and throw away your medicines at www.disposemymeds.org.          This list is accurate as of 2/21/18 11:59 PM.  Always use your most recent med list.                   Brand Name Dispense Instructions for use Diagnosis    levETIRAcetam 500 MG tablet    KEPPRA    360 tablet    Take 2 tablets (1,000 mg) by mouth 2 times daily    Generalized convulsive epilepsy (H)

## 2018-02-22 DIAGNOSIS — G40.309 GENERALIZED CONVULSIVE EPILEPSY (H): ICD-10-CM

## 2018-02-22 RX ORDER — LEVETIRACETAM 500 MG/1
TABLET ORAL
Qty: 360 TABLET | Refills: 1 | OUTPATIENT
Start: 2018-02-22

## 2018-02-27 NOTE — PROCEDURES
[facility] EEG #  (Out-Patient Video-EEG Monitoring)    Name:     Sae Yip    MRN: [id number]   : 1980   Procedure Date: 2018   Duration of Recordin hours, 58 minutes.      CLINICAL SUMMARY:  This diagnostic video-EEG monitoring procedure was performed in evaluation of seizures in Sae Yip.  He was reported to be receiving levetiracetam at the time of this recording.      TECHNICAL SUMMARY:  This continuous EEG monitoring procedure was performed with 23 scalp electrodes in 10-20 system placements, and additional scalp, precordial and other surface electrodes used for electrical referencing and artifact detection.  A single channel of EKG was recorded for purposes of analyzing EKG artifacts in the EEG channels.  Video monitoring was utilized and periodically reviewed by EEG technologist and the physician for electroclinical correlation.    INTERICTAL EEG ACTIVITIES:  During maximal waking, there was a symmetric, well-modulated, approximately 9 Hz posterior dominant rhythm, which was attenuated on eye opening.  Lower amplitude faster activities predominated anteriorly.  Drowsiness was manifested by predominance of centrally maximum semirhythmic theta slowing and dropout of the posterior dominant rhythm during deeper drowsiness.  Symmetric sleep spindles were seen during stage II sleep.  There was symmetric bilateral driving in response to photic stimulation.  Hyperventilation resulted in a slight increase in baseline occurrence of atypical generalized spike-wave discharges.      During waking and drowsiness, there were occasional 1-6 second long bursts of high amplitude atypical generalized spike-wave discharges, often beginning as fast as 6 Hz, but usually with a sustained 3 Hz maximum, and occasionally with polyspike elements.     ICTAL RECORDINGS:  No electrographic seizures and no paroxysmal behavioral events occurred during this procedure.      SUMMARY OF VIDEO-EEG  MONITORING:    This was an abnormal waking, drowsy and stage II sleep EEG recording due to occasional brief bursts of atypical generalized spike-wave discharges.  No electrographic seizures and no paroxysmal behavioral events were recorded during the period of monitoring.    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  Clinical correlation is recommended.   Zia Chiu M.D., Professor of Neurology         D: 2018   T: 2018   MT: MAYDA      Name:     TIFFANIE MENEZES   MRN:      [id number]        Account:        [id number]   :      1980           Procedure Date: 2018      Document: [id number]

## 2018-09-26 ENCOUNTER — HOSPITAL ENCOUNTER (EMERGENCY)
Facility: CLINIC | Age: 38
Discharge: HOME | End: 2018-09-26
Attending: EMERGENCY MEDICINE | Admitting: EMERGENCY MEDICINE
Payer: COMMERCIAL

## 2018-09-26 VITALS
OXYGEN SATURATION: 99 % | WEIGHT: 230 LBS | BODY MASS INDEX: 32.08 KG/M2 | RESPIRATION RATE: 18 BRPM | TEMPERATURE: 98.9 F | SYSTOLIC BLOOD PRESSURE: 130 MMHG | DIASTOLIC BLOOD PRESSURE: 82 MMHG

## 2018-09-26 DIAGNOSIS — G40.909 SEIZURE DISORDER (H): ICD-10-CM

## 2018-09-26 LAB
ANION GAP SERPL CALCULATED.3IONS-SCNC: 22 MMOL/L (ref 3–14)
BASOPHILS # BLD AUTO: 0 10E9/L (ref 0–0.2)
BASOPHILS NFR BLD AUTO: 0.4 %
BUN SERPL-MCNC: 11 MG/DL (ref 7–30)
CALCIUM SERPL-MCNC: 8.8 MG/DL (ref 8.5–10.1)
CHLORIDE SERPL-SCNC: 108 MMOL/L (ref 94–109)
CO2 SERPL-SCNC: 14 MMOL/L (ref 20–32)
CREAT SERPL-MCNC: 1 MG/DL (ref 0.66–1.25)
DIFFERENTIAL METHOD BLD: NORMAL
EOSINOPHIL # BLD AUTO: 0.1 10E9/L (ref 0–0.7)
EOSINOPHIL NFR BLD AUTO: 0.5 %
ERYTHROCYTE [DISTWIDTH] IN BLOOD BY AUTOMATED COUNT: 12.7 % (ref 10–15)
GFR SERPL CREATININE-BSD FRML MDRD: 84 ML/MIN/1.7M2
GLUCOSE SERPL-MCNC: 128 MG/DL (ref 70–99)
HCT VFR BLD AUTO: 45.1 % (ref 40–53)
HGB BLD-MCNC: 15.1 G/DL (ref 13.3–17.7)
IMM GRANULOCYTES # BLD: 0.1 10E9/L (ref 0–0.4)
IMM GRANULOCYTES NFR BLD: 0.5 %
INTERPRETATION ECG - MUSE: NORMAL
LYMPHOCYTES # BLD AUTO: 2.3 10E9/L (ref 0.8–5.3)
LYMPHOCYTES NFR BLD AUTO: 24.7 %
MCH RBC QN AUTO: 30.7 PG (ref 26.5–33)
MCHC RBC AUTO-ENTMCNC: 33.5 G/DL (ref 31.5–36.5)
MCV RBC AUTO: 92 FL (ref 78–100)
MONOCYTES # BLD AUTO: 0.9 10E9/L (ref 0–1.3)
MONOCYTES NFR BLD AUTO: 9.7 %
NEUTROPHILS # BLD AUTO: 5.9 10E9/L (ref 1.6–8.3)
NEUTROPHILS NFR BLD AUTO: 64.2 %
NRBC # BLD AUTO: 0 10*3/UL
NRBC BLD AUTO-RTO: 0 /100
PLATELET # BLD AUTO: 254 10E9/L (ref 150–450)
POTASSIUM SERPL-SCNC: 3.5 MMOL/L (ref 3.4–5.3)
RBC # BLD AUTO: 4.92 10E12/L (ref 4.4–5.9)
SODIUM SERPL-SCNC: 144 MMOL/L (ref 133–144)
WBC # BLD AUTO: 9.2 10E9/L (ref 4–11)

## 2018-09-26 PROCEDURE — 80048 BASIC METABOLIC PNL TOTAL CA: CPT | Performed by: EMERGENCY MEDICINE

## 2018-09-26 PROCEDURE — 85025 COMPLETE CBC W/AUTO DIFF WBC: CPT | Performed by: EMERGENCY MEDICINE

## 2018-09-26 PROCEDURE — 25000128 H RX IP 250 OP 636: Performed by: EMERGENCY MEDICINE

## 2018-09-26 PROCEDURE — 93005 ELECTROCARDIOGRAM TRACING: CPT

## 2018-09-26 PROCEDURE — 99291 CRITICAL CARE FIRST HOUR: CPT

## 2018-09-26 PROCEDURE — 96374 THER/PROPH/DIAG INJ IV PUSH: CPT

## 2018-09-26 PROCEDURE — 96361 HYDRATE IV INFUSION ADD-ON: CPT

## 2018-09-26 RX ORDER — LEVETIRACETAM 500 MG/1
1000 TABLET ORAL 2 TIMES DAILY
Qty: 60 TABLET | Refills: 0 | Status: SHIPPED | OUTPATIENT
Start: 2018-09-26 | End: 2018-10-01 | Stop reason: DRUGHIGH

## 2018-09-26 RX ORDER — LEVETIRACETAM 10 MG/ML
1000 INJECTION INTRAVASCULAR ONCE
Status: COMPLETED | OUTPATIENT
Start: 2018-09-26 | End: 2018-09-26

## 2018-09-26 RX ADMIN — SODIUM CHLORIDE 1000 ML: 9 INJECTION, SOLUTION INTRAVENOUS at 17:49

## 2018-09-26 RX ADMIN — LEVETIRACETAM 1000 MG: 10 INJECTION INTRAVENOUS at 17:47

## 2018-09-26 ASSESSMENT — ENCOUNTER SYMPTOMS: SEIZURES: 1

## 2018-09-26 NOTE — ED PROVIDER NOTES
History     Chief Complaint:  Seizures     HPI   The patient's history was somewhat limited secondary to the acuity of the patient's condition.    Sae Yip is a 38 year old male with a history of seizures who presents to the ED for evaluation of seizures. EMS reports that the patient was at work this afternoon, when he called for help and had a generalized tonic-clonic, witnessed seizure, lasting 1-2 minutes. EMS was called to the scene to bring the patient to the ED. The patient came to in the ambulance, though then had a second seizure, and was given 5 mg of Versed. Prior to this, he had told EMS that he has a history of seizures, and has not been compliant with his medications recently. EMS additionally notes a blood sugar of 101 en route.    Allergies:  NKDA    Medications:    Keppra    Past Medical History:    Seizures    Past Surgical History:    The patient does not have any pertinent past surgical history.    Family History:    No past pertinent family history.    Social History:  Marital Status:   [2]  Negative for tobacco use.  Weekly alcohol use    Review of Systems   Unable to perform ROS: Acuity of condition   Neurological: Positive for seizures.     Physical Exam     Patient Vitals for the past 24 hrs:   BP Temp Temp src Heart Rate Resp SpO2 Weight   09/26/18 1930 122/76 - - 87 - 100 % -   09/26/18 1918 135/80 - - 79 - 100 % -   09/26/18 1815 137/85 - - 97 14 99 % -   09/26/18 1812 137/85 - - 102 14 100 % -   09/26/18 1800 120/61 - - 84 (!) 7 99 % -   09/26/18 1745 123/63 - - 87 17 97 % -   09/26/18 1743 137/79 98.9  F (37.2  C) Temporal 90 23 97 % 104.3 kg (230 lb)     Physical Exam  Vitals: reviewed by me  General: Pt seen on hospitals, arrives responding to sternal rub, intermittently slurred speech, eyes closed unless aroused.  Eyes: Tracking well, clear conjunctiva BL  ENT: MMM, midline trachea.  No C-spine tenderness, full range motion to neck, no evidence of trauma.    Lungs:  No tachypnea, no accessory muscle use. No respiratory distress.   CV: Rate as above, regular rhythm.    Abd: Soft, non tender, no guarding, no rebound. Non distended  MSK: no peripheral edema or joint effusion.  No evidence of trauma  Skin: No rash, normal turgor and temperature  Neuro: Does move all extremities, full range of motion to all extremities.  In my exam after patient is come to, bilateral upper and bilateral lower extremities are with sensation intact light touch and 5 out of 5 strength throughout.  Cranial nerves II through XII are intact bilaterally.  Psych: Not RIS, no e/o AH/VH      Emergency Department Course   ECG:  Indication: Seizures  Time: 1747  Vent. Rate 90 bpm. MO interval 200. QRS duration 112. QT/QTc 366/447. P-R-T axis 77 65 51.  Sinus rhythm. Possible left atrial enlargement. Borderline ECG. Read time: 1750    Laboratory:  CBC: WBC: 9.2, HGB: 15.1, PLT: 254  BMP: Glucose 128 (H), Carbon dioxide 14 (L), Anion gap 22 (H), o/w WNL (Creatinine: 1.00)    Interventions:  1747 Keppra 1000 mg IV  1749 NS 1L IV    Emergency Department Course:  Nursing notes and vitals reviewed. (1740) I performed an exam of the patient as documented above.     IV inserted. Medicine administered as documented above. Blood drawn. This was sent to the lab for further testing, results above.    EKG obtained in the ED, see results above.     (1829) I rechecked the patient and discussed the results of his workup thus far. The patient has come to. He reports that he drinks most days, and did not sleep well last night. He notes that he often gets seizures after drinking the night before.    (1934) I reevaluated the patient and provided an update in regards to his ED course.      (2030) I reevaluated the patient and provided an update in regards to his ED course.  The patient is safe for discharge home.     Findings and plan explained to the Patient. Patient discharged home with instructions regarding supportive  care, medications, and reasons to return. The importance of close follow-up was reviewed. The patient was prescribed Keppra.    I personally reviewed the laboratory results with the Patient and answered all related questions prior to discharge.     Impression & Plan      Medical Decision Making:  Sae Yip is a 38 year old male who presents to the ED for what appears to be a provoked seizure. He has a long standing diagnosis of a seizure disorder, and is on Keppra, though he has not been compliant for some time. He also states that he was up quite late last night, which is a known trigger for him. Here in the ED, he has returned to mental baseline with no issues and has been monitored for several hours with no complaints. I did load him with Keppra, as this is his home medication, and I did fill a prescription for him to use until he can see his neurologist later this week. He is okay with this plan. He spoke with his wife, who has come to pick him up, as he knows he cannot drive for the next several months until he is cleared by his neurologist. He has no evidence of meningitis. There is no evidence for CT scan as he has already had a thorough workup done in the outpatient setting per his neurologist notes, including an MRI and EEG. Will discharge as above with instructions to take his medications and get as much sleep as possible.     Critical care time 35 minutes for airway management, and emergent antiepileptic drugs.    Diagnosis:    ICD-10-CM    1. Seizure disorder (H) G40.909      Disposition:  discharged to home    Discharge Medications:  New Prescriptions    LEVETIRACETAM (KEPPRA) 500 MG TABLET    Take 2 tablets (1,000 mg) by mouth 2 times daily     Scribe Disclosure:  I, Batool Betancourt, am serving as a scribe on 9/26/2018 at 5:44 PM to personally document services performed by Dimas Barrios* based on my observations and the provider's statements to me.     Batool Betancourt  9/26/2018     EMERGENCY DEPARTMENT       Dimas Barrios MD  09/26/18 9339

## 2018-09-26 NOTE — ED AVS SNAPSHOT
Emergency Department    [address] [place] [address]    Phone:  [phone]    Fax:  [phone]                                       Sae Yip   MRN: [id number]    Department:   Emergency Department   Date of Visit:  9/26/2018           After Visit Summary Signature Page     I have received my discharge instructions, and my questions have been answered. I have discussed any challenges I see with this plan with the nurse or doctor.    ..........................................................................................................................................  Patient/Patient Representative Signature      ..........................................................................................................................................  Patient Representative Print Name and Relationship to Patient    ..................................................               ................................................  Date                                   Time    ..........................................................................................................................................  Reviewed by Signature/Title    ...................................................              ..............................................  Date                                               Time          22EPIC Rev 08/18

## 2018-09-26 NOTE — ED AVS SNAPSHOT
Emergency Department    [address] [facility] [address]    Phone:  [phone]    Fax:  [phone]                                       Sae Yip   MRN: [id number]    Department:   Emergency Department   Date of Visit:  9/26/2018           Patient Information     Date Of Birth          1980        Your diagnoses for this visit were:     Seizure disorder (H)        You were seen by Dimas Barrios MD.      Follow-up Information     Follow up with Your Neurologist . Schedule an appointment as soon as possible for a visit in 1 week.    Why:  For repeat evaluation and symptom check.  This is very important.         Follow up with  Emergency Department.    Specialty:  EMERGENCY MEDICINE    Why:  If symptoms worsen    Contact information:    [address] [facility] [id number] [phone]        Discharge Instructions         Living Well with Epilepsy  People with epilepsy can lead healthy, productive lives. Life with epilepsy can be challenging, but there are things you can do to make it easier. For example, you can pay attention to your emotions. If you feel down, upset, or scared, talk with your healthcare provider. And be open with the people in your life. Talking about epilepsy can help them understand. It can also help you feel better.  Coping with emotions  You may be scared to go out in public for fear of having a seizure. Or you may just get frustrated with having epilepsy. Such feelings are normal. But they can lead to anxiety and depression. Treatment is available for these conditions, so talk with your healthcare provider. Discuss what can help you, such as the following:    Support groups. These groups let you talk with other people who have epilepsy.    Counseling. Talking with a counselor can help you learn to cope with your emotions and health problems.    Medicine. This can help if you have a mood disorder.  Recognizing  signs of depression  Depression is an illness that affects your thoughts and feelings. It can be caused by trouble coping with epilepsy, and sometimes it may be caused by the medicines used to treat it. Depression can be serious. If you have any of the following, call your healthcare provider:    Feeling down most of the time    Feeling hopeless or helpless    Losing pleasure in things you used to enjoy    Sleeping less or more than usual    Having a big change in appetite or weight    Having trouble focusing, remembering, or making decisions    Staying away from friends or family    Coping at home  Epilepsy affects those around you, too. Talk with your loved ones and learn their concerns. For instance, your children may be afraid for your safety. Reassure them that you can live a long, healthy life with epilepsy. Your partner may wonder if a normal sex life is possible. Let him or her know that epilepsy doesn t have to affect your love life. If loved ones have questions, you can always arrange a talk with your healthcare provider.  Epilepsy and your job  Epilepsy doesn t have to keep you from working. In fact, people with epilepsy hold many kinds of jobs. But there are some issues you should consider, such as:    What kind of work can I do? This depends on several things, such as how well controlled your seizures are. Also think about whether the job involves tasks that may not be safe for you. These include driving or operating heavy machinery.    Should I tell my boss or coworkers about my epilepsy? This is your personal choice. But you may be safer if people at your workplace are prepared to respond to a seizure. If you are concerned about losing your job, know your rights. The Americans with Disabilities Act provides work-related protections for people with epilepsy.  Date Last Reviewed: 11/1/2017 2000-2017 [employer]. [address] [facility], [place] [address]. All rights reserved. This  information is not intended as a substitute for professional medical care. Always follow your healthcare professional's instructions.          Your next 10 appointments already scheduled     Dec 17, 2018  2:30 PM CST   (Arrive by 2:15 PM)   Return Seizure with Zia Chiu MD   [facility] Neurology ([facility])    [address] [facility]  [address] [facility] [address] [phone]              24 Hour Appointment Hotline       To make an appointment at any [facility], call [phone] ([phone]). If you don't have a family doctor or clinic, we will help you find one. [facility] are conveniently located to serve the needs of you and your family.             Review of your medicines      CONTINUE these medicines which may have CHANGED, or have new prescriptions. If we are uncertain of the size of tablets/capsules you have at home, strength may be listed as something that might have changed.        Dose / Directions Last dose taken    * levETIRAcetam 500 MG tablet   Commonly known as:  KEPPRA   Dose:  1000 mg   What changed:  Another medication with the same name was added. Make sure you understand how and when to take each.   Quantity:  360 tablet        Take 2 tablets (1,000 mg) by mouth 2 times daily   Refills:  3        * levETIRAcetam 500 MG tablet   Commonly known as:  KEPPRA   Dose:  1000 mg   What changed:  You were already taking a medication with the same name, and this prescription was added. Make sure you understand how and when to take each.   Quantity:  60 tablet        Take 2 tablets (1,000 mg) by mouth 2 times daily   Refills:  0        * Notice:  This list has 2 medication(s) that are the same as other medications prescribed for you. Read the directions carefully, and ask your doctor or other care provider to review them with you.            Prescriptions were sent or printed at these locations (1 Prescription)                   Other Prescriptions                 Printed at Department/Unit printer (1 of 1)         levETIRAcetam (KEPPRA) 500 MG tablet                Procedures and tests performed during your visit     Basic metabolic panel    CBC with platelets differential    EKG 12-lead, tracing only      Orders Needing Specimen Collection     None      Pending Results     No orders found from 9/24/2018 to 9/27/2018.            Pending Culture Results     No orders found from 9/24/2018 to 9/27/2018.            Pending Results Instructions     If you had any lab results that were not finalized at the time of your Discharge, you can call the ED Lab Result RN at [phone]. You will be contacted by this team for any positive Lab results or changes in treatment. The nurses are available 7 days a week from 10A to 6:30P.  You can leave a message 24 hours per day and they will return your call.        Test Results From Your Hospital Stay        9/26/2018  6:02 PM      Component Results     Component Value Ref Range & Units Status    WBC 9.2 4.0 - 11.0 10e9/L Final    RBC Count 4.92 4.4 - 5.9 10e12/L Final    Hemoglobin 15.1 13.3 - 17.7 g/dL Final    Hematocrit 45.1 40.0 - 53.0 % Final    MCV 92 78 - 100 fl Final    MCH 30.7 26.5 - 33.0 pg Final    MCHC 33.5 31.5 - 36.5 g/dL Final    RDW 12.7 10.0 - 15.0 % Final    Platelet Count 254 150 - 450 10e9/L Final    Diff Method Automated Method  Final    % Neutrophils 64.2 % Final    % Lymphocytes 24.7 % Final    % Monocytes 9.7 % Final    % Eosinophils 0.5 % Final    % Basophils 0.4 % Final    % Immature Granulocytes 0.5 % Final    Nucleated RBCs 0 0 /100 Final    Absolute Neutrophil 5.9 1.6 - 8.3 10e9/L Final    Absolute Lymphocytes 2.3 0.8 - 5.3 10e9/L Final    Absolute Monocytes 0.9 0.0 - 1.3 10e9/L Final    Absolute Eosinophils 0.1 0.0 - 0.7 10e9/L Final    Absolute Basophils 0.0 0.0 - 0.2 10e9/L Final    Abs Immature Granulocytes 0.1 0 - 0.4 10e9/L Final    Absolute Nucleated RBC 0.0  Final         9/26/2018  6:18  PM      Component Results     Component Value Ref Range & Units Status    Sodium 144 133 - 144 mmol/L Final    Potassium 3.5 3.4 - 5.3 mmol/L Final    Chloride 108 94 - 109 mmol/L Final    Carbon Dioxide 14 (L) 20 - 32 mmol/L Final    Anion Gap 22 (H) 3 - 14 mmol/L Final    Glucose 128 (H) 70 - 99 mg/dL Final    Urea Nitrogen 11 7 - 30 mg/dL Final    Creatinine 1.00 0.66 - 1.25 mg/dL Final    GFR Estimate 84 >60 mL/min/1.7m2 Final    Non  GFR Calc    GFR Estimate If Black >90 >60 mL/min/1.7m2 Final    African American GFR Calc    Calcium 8.8 8.5 - 10.1 mg/dL Final                Clinical Quality Measure: Blood Pressure Screening     Your blood pressure was checked while you were in the emergency department today. The last reading we obtained was  BP: 122/76 . Please read the guidelines below about what these numbers mean and what you should do about them.  If your systolic blood pressure (the top number) is less than 120 and your diastolic blood pressure (the bottom number) is less than 80, then your blood pressure is normal. There is nothing more that you need to do about it.  If your systolic blood pressure (the top number) is 120-139 or your diastolic blood pressure (the bottom number) is 80-89, your blood pressure may be higher than it should be. You should have your blood pressure rechecked within a year by a primary care provider.  If your systolic blood pressure (the top number) is 140 or greater or your diastolic blood pressure (the bottom number) is 90 or greater, you may have high blood pressure. High blood pressure is treatable, but if left untreated over time it can put you at risk for heart attack, stroke, or kidney failure. You should have your blood pressure rechecked by a primary care provider within the next 4 weeks.  If your provider in the emergency department today gave you specific instructions to follow-up with your doctor or provider even sooner than that, you should follow  "that instruction and not wait for up to 4 weeks for your follow-up visit.        Thank you for choosing [facility]       Thank you for choosing [facility] for your care. Our goal is always to provide you with excellent care. Hearing back from our patients is one way we can continue to improve our services. Please take a few minutes to complete the written survey that you may receive in the mail after you visit with us. Thank you!        [employer] Information     [employer] lets you send messages to your doctor, view your test results, renew your prescriptions, schedule appointments and more. To sign up, go to [email or web address] . Click on \"Log in\" on the left side of the screen, which will take you to the Welcome page. Then click on \"Sign up Now\" on the right side of the page.     You will be asked to enter the access code listed below, as well as some personal information. Please follow the directions to create your username and password.     Your access code is: [email or web address]  Expires: 2018  9:02 PM     Your access code will  in 90 days. If you need help or a new code, please call your [facility] clinic or [phone].        Care EveryWhere ID     This is your Care EveryWhere ID. This could be used by other organizations to access your [facility] medical records  [id number]        Equal Access to Services     UMESH PATRICK : Hadii leandro [place], waaxda luqadaha, qaybta kaalmada adedarren, nicholas bach . So [facility] [phone].    ATENCIÓN: Si habla español, tiene a jimenez disposición servicios gratuitos de asistencia lingüística. Llame al [phone].    We comply with applicable federal civil rights laws and [place] laws. We do not discriminate on the basis of race, color, national origin, age, disability, sex, sexual orientation, or gender identity.            After Visit Summary       This is your record. Keep this with you and show to your community pharmacist(s) and " doctor(s) at your next visit.

## 2018-09-26 NOTE — ED NOTES
Bed: ST01  Expected date:   Expected time:   Means of arrival:   Comments:  Monica 89 Seizures 38 male

## 2018-09-27 NOTE — ED NOTES
Walked with the pt to the restroom. Pt felt unsteady while standing, but walked without assistance and did not fall.

## 2018-09-27 NOTE — DISCHARGE INSTRUCTIONS
Living Well with Epilepsy  People with epilepsy can lead healthy, productive lives. Life with epilepsy can be challenging, but there are things you can do to make it easier. For example, you can pay attention to your emotions. If you feel down, upset, or scared, talk with your healthcare provider. And be open with the people in your life. Talking about epilepsy can help them understand. It can also help you feel better.  Coping with emotions  You may be scared to go out in public for fear of having a seizure. Or you may just get frustrated with having epilepsy. Such feelings are normal. But they can lead to anxiety and depression. Treatment is available for these conditions, so talk with your healthcare provider. Discuss what can help you, such as the following:    Support groups. These groups let you talk with other people who have epilepsy.    Counseling. Talking with a counselor can help you learn to cope with your emotions and health problems.    Medicine. This can help if you have a mood disorder.  Recognizing signs of depression  Depression is an illness that affects your thoughts and feelings. It can be caused by trouble coping with epilepsy, and sometimes it may be caused by the medicines used to treat it. Depression can be serious. If you have any of the following, call your healthcare provider:    Feeling down most of the time    Feeling hopeless or helpless    Losing pleasure in things you used to enjoy    Sleeping less or more than usual    Having a big change in appetite or weight    Having trouble focusing, remembering, or making decisions    Staying away from friends or family    Coping at home  Epilepsy affects those around you, too. Talk with your loved ones and learn their concerns. For instance, your children may be afraid for your safety. Reassure them that you can live a long, healthy life with epilepsy. Your partner may wonder if a normal sex life is possible. Let him or her know that  epilepsy doesn t have to affect your love life. If loved ones have questions, you can always arrange a talk with your healthcare provider.  Epilepsy and your job  Epilepsy doesn t have to keep you from working. In fact, people with epilepsy hold many kinds of jobs. But there are some issues you should consider, such as:    What kind of work can I do? This depends on several things, such as how well controlled your seizures are. Also think about whether the job involves tasks that may not be safe for you. These include driving or operating heavy machinery.    Should I tell my boss or coworkers about my epilepsy? This is your personal choice. But you may be safer if people at your workplace are prepared to respond to a seizure. If you are concerned about losing your job, know your rights. The Americans with Disabilities Act provides work-related protections for people with epilepsy.  Date Last Reviewed: 11/1/2017 2000-2017 [employer]. [address] [place] [address], [place] [address]. All rights reserved. This information is not intended as a substitute for professional medical care. Always follow your healthcare professional's instructions.

## 2018-10-01 ENCOUNTER — OFFICE VISIT (OUTPATIENT)
Dept: NEUROLOGY | Facility: CLINIC | Age: 38
End: 2018-10-01
Payer: COMMERCIAL

## 2018-10-01 VITALS
HEIGHT: 71 IN | HEART RATE: 70 BPM | WEIGHT: 193.7 LBS | OXYGEN SATURATION: 96 % | SYSTOLIC BLOOD PRESSURE: 128 MMHG | BODY MASS INDEX: 27.12 KG/M2 | RESPIRATION RATE: 18 BRPM | DIASTOLIC BLOOD PRESSURE: 80 MMHG

## 2018-10-01 DIAGNOSIS — G40.309 EPILEPTIC SEIZURE, GENERALIZED, CONVULSIVE (H): ICD-10-CM

## 2018-10-01 DIAGNOSIS — G40.309 EPILEPTIC SEIZURE, GENERALIZED, CONVULSIVE (H): Primary | ICD-10-CM

## 2018-10-01 DIAGNOSIS — G40.309 GENERALIZED CONVULSIVE EPILEPSY (H): ICD-10-CM

## 2018-10-01 RX ORDER — LEVETIRACETAM 500 MG/1
1000 TABLET ORAL 2 TIMES DAILY
Qty: 360 TABLET | Refills: 3 | Status: SHIPPED | OUTPATIENT
Start: 2018-10-01 | End: 2019-06-25

## 2018-10-01 ASSESSMENT — PAIN SCALES - GENERAL: PAINLEVEL: NO PAIN (0)

## 2018-10-01 NOTE — NURSING NOTE
Chief Complaint   Patient presents with     RECHECK     P RETURN PATIENT VISIT FOR F/U - SEIZURE        Angelique Maravilla MA

## 2018-10-01 NOTE — LETTER
10/1/2018       RE: Sae Yip  [address] [facility] [address]     Dear Colleague,    Thank you for referring your patient, Sae Yip, to the [facility] NEUROLOGY at [facility]. Please see a copy of my visit note below.      [facility]: RETURN VISIT     I spent 40 minutes in this patient care, more than 50% of which consisted of counseling and coordinating care.   Zia Chiu M.D.       Again, thank you for allowing me to participate in the care of your patient.      Sincerely,    Zia Chiu MD

## 2018-10-01 NOTE — MR AVS SNAPSHOT
After Visit Summary   10/1/2018    Sae Yip    MRN: [id number]           Patient Information     Date Of Birth          1980        Visit Information        Provider Department      10/1/2018 6:30 PM Zia Chiu MD [facility]        Today's Diagnoses     Epileptic seizure, generalized, convulsive (H)    -  1    Generalized convulsive epilepsy (H)           Follow-ups after your visit        Follow-up notes from your care team     Return in about 4 months (around 2/1/2019).      Your next 10 appointments already scheduled     Oct 01, 2018  7:30 PM CDT   LAB with  LAB   [facility] ([facility])    [address] [place] [id number] [phone]           Please do not eat 10-12 hours before your appointment if you are coming in fasting for labs on lipids, cholesterol, or glucose (sugar). This does not apply to pregnant women. Water, hot tea and black coffee (with nothing added) are okay. Do not drink other fluids, diet soda or chew gum.            Dec 17, 2018  2:30 PM CST   (Arrive by 2:15 PM)   Return Seizure with Zia Chiu MD   [facility] ([facility])    [address] [place] [id number] [phone]            Feb 05, 2019  4:30 PM CST   (Arrive by 4:15 PM)   Return Seizure with Zia Chiu MD   [facility] ([facility])    [address] [place] [id number] [phone]              Future tests that were ordered for you today     Open Future Orders        Priority Expected Expires Ordered    Keppra (Levetiracetam) Level Routine  10/1/2019 10/1/2018            Who to contact     Please call your clinic at [phone] to:    Ask questions about your health    Make or cancel appointments    Discuss your medicines    Learn about your test results    Speak to your doctor             "Additional Information About Your Visit        [employer] Information     [facility] is an electronic gateway that provides easy, online access to your medical records. With [facility], you can request a clinic appointment, read your test results, renew a prescription or communicate with your care team.     To sign up for [facility] visit the website at [email or web address]   You will be asked to enter the access code listed below, as well as some personal information. Please follow the directions to create your username and password.     Your access code is: [email or web address]  Expires: 2018  9:02 PM     Your access code will  in 90 days. If you need help or a new code, please contact your [facility] or call [phone] for assistance.        Care EveryWhere ID     This is your Care EveryWhere ID. This could be used by other organizations to access your [facility] medical records  [id number]        Your Vitals Were     Pulse Respirations Height Pulse Oximetry BMI (Body Mass Index)       70 18 1.803 m (5' 11\") 96% 27.02 kg/m2        Blood Pressure from Last 3 Encounters:   10/01/18 128/80   18 130/82   18 132/52    Weight from Last 3 Encounters:   10/01/18 87.9 kg (193 lb 11.2 oz)   18 104.3 kg (230 lb)   18 96.5 kg (212 lb 11.2 oz)                 Today's Medication Changes          These changes are accurate as of 10/1/18  7:27 PM.  If you have any questions, ask your nurse or doctor.               These medicines have changed or have updated prescriptions.        Dose/Directions    levETIRAcetam 500 MG tablet   Commonly known as:  KEPPRA   This may have changed:  Another medication with the same name was removed. Continue taking this medication, and follow the directions you see here.   Used for:  Generalized convulsive epilepsy (H)   Changed by:  Zia Chiu MD        Dose:  1000 mg   Take 2 tablets (1,000 mg) by mouth 2 times daily   Quantity:  " 360 tablet   Refills:  3            Where to get your medicines      These medications were sent to [facility] - [place], [place] - [address]  [address] [place] [address]     Phone:  [phone]     levETIRAcetam 500 MG tablet                Primary Care Provider Fax #    Physician No Ref-Primary [phone]       No address on file        Equal Access to Services     [facility]: Hadii aad ku hadasho Soomaali, waaxda luqadaha, qaybta kaalmada adeegyada, waxay idiin hayaan adeeg khjustinsh lasusana . So [facility] [place] [facility] [phone].    ATENCIÓN: Si habla español, tiene a jimenez disposición servicios gratuitos de asistencia lingüística. Naunestella al [phone].    We comply with applicable federal civil rights laws and [place] laws. We do not discriminate on the basis of race, color, national origin, age, disability, sex, sexual orientation, or gender identity.            Thank you!     Thank you for choosing [facility] NEUROLOG[facility]  for your care. Our goal is always to provide you with excellent care. Hearing back from our patients is one way we can continue to improve our services. Please take a few minutes to complete the written survey that you may receive in the mail after your visit with us. Thank you!             Your Updated Medication List - Protect others around you: Learn how to safely use, store and throw away your medicines at www.disposemymeds.org.          This list is accurate as of 10/1/18  7:27 PM.  Always use your most recent med list.                   Brand Name Dispense Instructions for use Diagnosis    levETIRAcetam 500 MG tablet    KEPPRA    360 tablet    Take 2 tablets (1,000 mg) by mouth 2 times daily    Generalized convulsive epilepsy (H)

## 2018-10-01 NOTE — LETTER
Date:October 3, 2018      Patient was self referred, no letter generated. Do not send.        [facility] Information

## 2018-10-02 ASSESSMENT — PATIENT HEALTH QUESTIONNAIRE - PHQ9: SUM OF ALL RESPONSES TO PHQ QUESTIONS 1-9: 0

## 2018-10-02 NOTE — PROGRESS NOTES
[facility] Neurology Epilepsy Clinic: RETURN VISIT     HISTORY: Mr. 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  He came to the visit today alone.      The patient reported that following the most recent visit to this clinic on 01/29/2018, he had 2 grand mal seizures, which both occurred on 09/26/2018.  He missed about half of the prescribed levetiracetam doses for about 2 weeks before the seizures occurred, and also had very little sleep on the night before the seizures occurred.  While he has had some AED non-compliance and some less than optimal sleep hygiene for the last 6 months, these pre-seizure deviations were the most severe deviations from good compliance and hygiene that occurred in the last 6 months, by his estimate.  H now is fully compliant and sleeping about 8 hours per night, since the day after the seizures occurred.    He has had some hypnic jerks when he is clearly drowsy, but he has not had any jerks while awake.  He denied having adverse effects of levetiracetam.      Ictal semiology-history:   The patient again confirmed previously presented history today.  Previously he reported that his most recent seizure occurred in [place] on March 30, 2013, when he had been drinking alcohol and having reduced sleep for several days prior to the seizure event which occurred on Saturday in the afternoon at approximately 4:00 p.m.  The seizure was witnessed by several of his friends in the hotel room where they described that he had a loud grunting sound produced followed by a generalized tonic-clonic convulsion which appeared to last for approximately 1-2 minutes followed by a several hour postictal state of confusion and lethargy. The patient had bitten his tongue on the left lower side. There was no urine incontinence. He was taken to an emergency room in [place] where he underwent a CAT scan which was  normal and was discharged on Keppra 500 mg twice daily.     As per his history the patient states that at the age of 21 he experienced a similar event associated with the same surrounding circumstances such as drinking alcohol and having a lack of sleep. 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 The patient states that he was totally alert and conscious during this period but felt that his perception of events were somewhat altered. He states that these were his only 2 generalized tonic-clonic convulsions that he has had throughout his life. He denies ever having convulsions prior to these 2.     Epilepsy-seizure predispositions:   He denied any history of childhood febrile seizures, denies any history of childhood myoclonic jerking activity, particularly in the morning hours or the evening hours prior to going to sleep. He denied any history of absence episodes or staring episodes or difficulties with school as a child.     The patient has no family history of epilepsy or seizures, except for a great aunt who did have epilepsy; he does not know details of her seizure history.     Developmentally he hit his milestones at appropriate times during his childhood.  He has no history of gestational or  injury, febrile convulsions, developmental delay, stroke, meningitis, encephalitis, significant head injury, or other epileptic predispositions. He denied a history of physical or sexual abuse by an adult during his childhood or adulthood.     He does state that for approximately 6 months to 1 year after having his first generalized tonic-clonic convulsion at the age of 21 that he had had these  "periods lasting anywhere from 4-6 hours of confusion and \"not having mind work correctly\". He cannot describe the details of these events and he denies ever having loss of consciousness with any of these events, but states that when asked to perform complex tasks during this time period he just is unable to do so. He gives references to knowing that a task should be performed at a certain time and realizing that this task was not performed at that time and unclear as to why he was not able to do that.     Laboratory evaluations:   He previously reported that when he was 21 years old he did have a neurologic evaluation which included an MRI scan as well as an EEG which did reveal abnormalities on the EEG which appeared to be described as possible left temporal sharp wave discharges, which were reported as  not definitive . His MRI scan was reported to be normal, although these records are unavailable for review at this time.     On April 8, 2013, at [facility], a brain MRI was abnormal due to a left frontal lobe subcortical white matter abnormality c/w gliosis.     On May 30, 2013, at [facility], a 24-hour ambulatory EEG recording showed frequent 1-6 second discharges of 3 Hz generalized spike-wave activity, mainly in drowsiness, with no seizures.     On February16, 2016, at [facility], a prolonged outpatient EEG recording showed occasional brief bursts of generalized atypical spike-wave discharges, mainly during slow wave sleep.     Epilepsy therapeutics:   His only epilepsy therapy has been levetiracetam.    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    PERSONAL AND SOCIAL HISTORY:  He currently is a  for an [employer] firm in [place]. He lives with his wife and their daughter. He uses alcohol as 1-2 drinks 1-2 times per week. He denies using illicit drugs.     REVIEW OF SYSTEMS: The patient denied history of attention and memory " disturbance, difficulties with comprehension and expression, difficulty in solving problems, weakness, tremors or other abnormal involuntary movements, numbness or tingling, incoordination, falling or difficulty in walking, urinary or fecal incontinence, headache and other pain, except as described above. The patient denied any history of severe depression or suicidal ideation, severe anxiety, panic attacks, hallucinations, delusions, psychosis, substance abuse, or psychiatric hospitalization. He denied rashes and easy bruising. The remainder of a 10-system symptom review was negative.     MEDICATIONS: Levetiracetam 1000 mg b.i.d.     ALLERGIES: The patient denied any known medication allergies.     PHYSICAL EXAMINATION:   On physical examination the patient appeared to be in no acute distress.  Vital signs were as per the electronic medical record.  Skull was normocephalic and atraumatic.  Neck was supple, without signs of meningeal irritation.      On neurological examination the patient appeared alert and was fully oriented to person, place, time, and reason for visit.  Speech showed grossly normal articulation, fluency, repetitions, naming, syntax and comprehension.  No apraxias or atavistic signs were elicited.  Cranial nerves III through XII were normal.  Muscle masses, tones and strengths were normal throughout.  There was no pronator drift.  No tremors, myoclonus, or other abnormal movements were observed.  Sensations of light touch, pin prick, vibration and proprioception were reportedly normal throughout.  The rapid alternating movements, and finger-nose-finger and heel-shin maneuvers were performed normally bilaterally.  Deep tendon reflexes were normal and symmetric throughout.  Toes were downgoing bilaterally.  Romberg maneuver was negative.  Regular, tandem and reverse tandem walking were normal.      IMPRESSION:   The patient was seizure-free on levetiracetam monotherapy, but with major AED  non-compliance and also sleep deprivation he had 2 GTC seizures on one day.  He has redoubled his efforts to take levetiracetam and to sleep enough.  We will check a serum level today.      I spent a considerable amount of time discussing strategies to be certain that he does not miss doses of levetiracetam.  He did agree to use a weekly pill container.  We discussed making up every missed dose, although he must try a avoid missing any doses.    I once more reviewed [place] regulations on seizures and driving with the patient. He appeared to clearly understand that he would be prohibited from operating a motor vehicle within 3 months following any future seizure or other episode with sudden unconsciousness or inability to sit up, and that he is required to report any future such seizure to the [facility] within 30 days after the event. I also recommended that he and his family review all of his other activities, and avoid any activities that might lead to self-injury or injury of others, within 6 months following any seizure with impaired awareness or impaired motor control. Such activities include but are not limited to holding babies or young children at heights from which they might be injured if dropped, bathing infants or young children in situations in which they might drown without continuous interactive care by an adult who is fully capable at all times during the bath, operating power cutting or other tools, handling firearms, exposure to heights from which he might fall, exposure to vessels with hot cooking oil or water, and swimming alone.     PLAN:   1.  Check serum levetiracetam level today.   2.  Continue levetiracetam at 1000 mg b.i.d.   3.  Return visit in approximately 4 months.     I spent 40 minutes in this patient care, more than 50% of which consisted of counseling and coordinating care.       Zia Chiu M.D.   Professor of Neurology

## 2018-10-04 LAB — LEVETIRACETAM SERPL-MCNC: 12 UG/ML (ref 12–46)

## 2018-12-17 ENCOUNTER — OFFICE VISIT (OUTPATIENT)
Dept: NEUROLOGY | Facility: CLINIC | Age: 38
End: 2018-12-17
Payer: COMMERCIAL

## 2018-12-17 ENCOUNTER — APPOINTMENT (OUTPATIENT)
Dept: LAB | Facility: CLINIC | Age: 38
End: 2018-12-17
Payer: COMMERCIAL

## 2018-12-17 VITALS
OXYGEN SATURATION: 98 % | DIASTOLIC BLOOD PRESSURE: 73 MMHG | BODY MASS INDEX: 27.13 KG/M2 | WEIGHT: 193.78 LBS | HEART RATE: 55 BPM | SYSTOLIC BLOOD PRESSURE: 119 MMHG | HEIGHT: 71 IN

## 2018-12-17 DIAGNOSIS — G40.309 EPILEPTIC SEIZURE, GENERALIZED, CONVULSIVE (H): Primary | ICD-10-CM

## 2018-12-17 ASSESSMENT — MIFFLIN-ST. JEOR: SCORE: 1826.13

## 2018-12-17 ASSESSMENT — PAIN SCALES - GENERAL: PAINLEVEL: NO PAIN (0)

## 2018-12-17 NOTE — LETTER
Date:December 18, 2018      Patient was self referred, no letter generated. Do not send.        [facility] Information

## 2018-12-17 NOTE — LETTER
12/17/2018       RE: Sae Yip  [address] [facility] [address]     Dear Colleague,    Thank you for referring your patient, Sae Yip, to the [facility] NEUROLOGY at [facility]. Please see a copy of my visit note below.      [facility] Epilepsy Clinic: RETURN VISIT     I spent 25 minutes in this patient care, more than 50% of which consisted of counseling and coordinating care.   Zia Chiu M.D.       Again, thank you for allowing me to participate in the care of your patient.      Sincerely,    Zia Chiu MD

## 2018-12-17 NOTE — NURSING NOTE
Chief Complaint   Patient presents with     RECHECK     [facility] RETURN SEIZURE       Eryn Recinos, EMT

## 2018-12-18 NOTE — PROGRESS NOTES
[facility] Epilepsy Clinic: RETURN VISIT     HISTORY: Mr. 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  He came to the visit today alone.      The patient reported that following the most recent visit to this clinic on 10/02/2018, he has had no further grand mal seizures.  Earlier this year he had 2 grand mal seizures, which both occurred on 09/26/2018.  He missed about half of the prescribed levetiracetam doses for about 2 weeks before the seizures occurred, and also had very little sleep on the night before the seizures occurred.  While he has had some AED non-compliance and some less than optimal sleep hygiene for the last 6 months, these pre-seizure deviations were the most severe deviations from good compliance and hygiene that occurred in the last 6 months, by his estimate.  He now is fully compliant and sleeping about 8 hours per night, since the day after the seizures occurred.    He has had some hypnic jerks when he is clearly drowsy, but he has not had any jerks while awake.  He denied having adverse effects of levetiracetam.      Ictal semiology-history:   The patient again confirmed previously presented history today.  Previously he reported that his most recent seizure occurred in [place] on March 30, 2013, when he had been drinking alcohol and having reduced sleep for several days prior to the seizure event which occurred on Saturday in the afternoon at approximately 4:00 p.m.  The seizure was witnessed by several of his friends in the hotel room where they described that he had a loud grunting sound produced followed by a generalized tonic-clonic convulsion which appeared to last for approximately 1-2 minutes followed by a several hour postictal state of confusion and lethargy. The patient had bitten his tongue on the left lower side. There was no urine incontinence. He was taken to an emergency room in  [place] where he underwent a CAT scan which was normal and was discharged on Keppra 500 mg twice daily.     As per his history the patient states that at the age of 21 he experienced a similar event associated with the same surrounding circumstances such as drinking alcohol and having a lack of sleep. 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 The patient states that he was totally alert and conscious during this period but felt that his perception of events were somewhat altered. He states that these were his only 2 generalized tonic-clonic convulsions that he has had throughout his life. He denies ever having convulsions prior to these 2.     Epilepsy-seizure predispositions:   He denied any history of childhood febrile seizures, denies any history of childhood myoclonic jerking activity, particularly in the morning hours or the evening hours prior to going to sleep. He denied any history of absence episodes or staring episodes or difficulties with school as a child.     The patient has no family history of epilepsy or seizures, except for a great aunt who did have epilepsy; he does not know details of her seizure history.     Developmentally he hit his milestones at appropriate times during his childhood.  He has no history of gestational or  injury, febrile convulsions, developmental delay, stroke, meningitis, encephalitis, significant head injury, or other epileptic predispositions. He denied a history of physical or sexual abuse by an adult during his childhood or adulthood.     He does state that for approximately 6 months to 1 year after having his first generalized tonic-clonic  "convulsion at the age of 21 that he had had these periods lasting anywhere from 4-6 hours of confusion and \"not having mind work correctly\". He cannot describe the details of these events and he denies ever having loss of consciousness with any of these events, but states that when asked to perform complex tasks during this time period he just is unable to do so. He gives references to knowing that a task should be performed at a certain time and realizing that this task was not performed at that time and unclear as to why he was not able to do that.     Laboratory evaluations:   He previously reported that when he was 21 years old he did have a neurologic evaluation which included an MRI scan as well as an EEG which did reveal abnormalities on the EEG which appeared to be described as possible left temporal sharp wave discharges, which were reported as  not definitive . His MRI scan was reported to be normal, although these records are unavailable for review at this time.     On April 8, 2013, at [facility], a brain MRI was abnormal due to a left frontal lobe subcortical white matter abnormality c/w gliosis.     On May 30, 2013, at [facility], a 24-hour ambulatory EEG recording showed frequent 1-6 second discharges of 3 Hz generalized spike-wave activity, mainly in drowsiness, with no seizures.     On February 16, 2016, at [facility], a prolonged outpatient EEG recording showed occasional brief bursts of generalized atypical spike-wave discharges, mainly during slow wave sleep.     Epilepsy therapeutics:   His only epilepsy therapy has been levetiracetam.    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 He lives with his wife and their daughter. He uses alcohol as 1-2 drinks 1-2 times per week. He denies using illicit drugs.     REVIEW OF SYSTEMS: " The patient denied history of attention and memory disturbance, difficulties with comprehension and expression, difficulty in solving problems, weakness, tremors or other abnormal involuntary movements, numbness or tingling, incoordination, falling or difficulty in walking, urinary or fecal incontinence, headache and other pain, except as described above. The patient denied any history of severe depression or suicidal ideation, severe anxiety, panic attacks, hallucinations, delusions, psychosis, substance abuse, or psychiatric hospitalization. He denied rashes and easy bruising. The remainder of a 10-system symptom review was negative.     MEDICATIONS: Levetiracetam 1000 mg b.i.d.     ALLERGIES: The patient denied any known medication allergies.     PHYSICAL EXAMINATION:   On physical examination the patient appeared to be in no acute distress.  Vital signs were as per the electronic medical record.  Skull was normocephalic and atraumatic.  Neck was supple, without signs of meningeal irritation.      On neurological examination the patient appeared alert and was fully oriented to person, place, time, and reason for visit.  Speech showed grossly normal articulation, fluency, repetitions, naming, syntax and comprehension.  No apraxias or atavistic signs were elicited.  Cranial nerves III through XII were normal.  Muscle masses, tones and strengths were normal throughout.  There was no pronator drift.  No tremors, myoclonus, or other abnormal movements were observed.  Sensations of light touch, pin prick, vibration and proprioception were reportedly normal throughout.  The rapid alternating movements, and finger-nose-finger and heel-shin maneuvers were performed normally bilaterally.  Deep tendon reflexes were normal and symmetric throughout.  Toes were downgoing bilaterally.  Romberg maneuver was negative.  Regular, tandem and reverse tandem walking were normal.      IMPRESSION:   The patient was seizure-free on  levetiracetam monotherapy, but with major AED non-compliance and also sleep deprivation he had 2 GTC seizures on one day, 09/26/2018.  He has redoubled his efforts to take levetiracetam and to sleep enough.  A recent serum level was only 12 mcg/ml, however.  We discussed increasing the dose, but he declined due to concerns that excessive drowsiness might occur.  We instead agreed to re-check a level today, with the possibility of increasing the dose in the future if the level remains low.      I reiterated strategies to be certain that he does not miss doses of levetiracetam.  He now is using a weekly pill container.  We discussed making up every missed dose, although he must try a avoid missing any doses.    He stopped driving after the seizures on 09/26/2018.  I once more reviewed [place] regulations on seizures and driving with the patient. He appeared to clearly understand that he would be prohibited from operating a motor vehicle within 3 months following any future seizure or other episode with sudden unconsciousness or inability to sit up, and that he is required to report any future such seizure to the DMV within 30 days after the event. I also recommended that he and his family review all of his other activities, and avoid any activities that might lead to self-injury or injury of others, within 6 months following any seizure with impaired awareness or impaired motor control. Such activities include but are not limited to holding babies or young children at heights from which they might be injured if dropped, bathing infants or young children in situations in which they might drown without continuous interactive care by an adult who is fully capable at all times during the bath, operating power cutting or other tools, handling firearms, exposure to heights from which he might fall, exposure to vessels with hot cooking oil or water, and swimming alone.     PLAN:   1.  Check serum levetiracetam level today.    2.  Continue levetiracetam at 1000 mg b.i.d.   3.  Return visit in approximately 6 months.     I spent 25 minutes in this patient care, more than 50% of which consisted of counseling and coordinating care.       Zia Chiu M.D.   Professor of Neurology

## 2018-12-19 LAB — LEVETIRACETAM SERPL-MCNC: 21 UG/ML (ref 12–46)

## 2018-12-26 ENCOUNTER — TELEPHONE (OUTPATIENT)
Dept: NEUROLOGY | Facility: CLINIC | Age: 38
End: 2018-12-26

## 2018-12-26 NOTE — TELEPHONE ENCOUNTER
[employer]V form routed to MD for review/signature    [employer]V form received via fax on 12/26/18. Form placed in nurse folder for completion.

## 2019-06-25 ENCOUNTER — OFFICE VISIT (OUTPATIENT)
Dept: NEUROLOGY | Facility: CLINIC | Age: 39
End: 2019-06-25
Payer: COMMERCIAL

## 2019-06-25 VITALS
SYSTOLIC BLOOD PRESSURE: 112 MMHG | HEART RATE: 55 BPM | OXYGEN SATURATION: 98 % | WEIGHT: 183 LBS | DIASTOLIC BLOOD PRESSURE: 73 MMHG | BODY MASS INDEX: 25.52 KG/M2

## 2019-06-25 DIAGNOSIS — G40.309 EPILEPTIC SEIZURE, GENERALIZED, CONVULSIVE (H): Primary | ICD-10-CM

## 2019-06-25 DIAGNOSIS — G40.309 GENERALIZED CONVULSIVE EPILEPSY (H): ICD-10-CM

## 2019-06-25 RX ORDER — LEVETIRACETAM 500 MG/1
1000 TABLET ORAL 2 TIMES DAILY
Qty: 360 TABLET | Refills: 3 | Status: SHIPPED | OUTPATIENT
Start: 2019-06-25 | End: 2020-05-12

## 2019-06-25 ASSESSMENT — PAIN SCALES - GENERAL: PAINLEVEL: NO PAIN (0)

## 2019-06-25 NOTE — LETTER
6/25/2019       RE: Sae Yip  [address] [place] [facility] [address]     Dear Colleague,    Thank you for referring your patient, Sae Yip, to the [facility] NEUROLOGY at [facility]. Please see a copy of my visit note below.      [facility] Epilepsy Clinic:   RETURN VISIT           Service Date: 06/25/2019      I spent 25 minutes in this patient care, more than 50% of which consisted of counseling and coordinating care.       Zia Chiu M.D.   Professor of Neurology      Again, thank you for allowing me to participate in the care of your patient.      Sincerely,    Zia Chiu MD

## 2019-06-25 NOTE — LETTER
Date:June 28, 2019      Patient was self referred, no letter generated. Do not send.        [facility] Health Information

## 2019-06-25 NOTE — NURSING NOTE
Chief Complaint   Patient presents with     RECHECK     [facility] RETURN SEIZURE 6 MO F/U       Eryn Recinos, EMT
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
## 2019-10-28 ENCOUNTER — TELEPHONE (OUTPATIENT)
Dept: NEUROLOGY | Facility: CLINIC | Age: 39
End: 2019-10-28

## 2019-10-28 NOTE — TELEPHONE ENCOUNTER
No record of form having been received at either [facility], or at [facility]  Call placed to patient, message left including fax numbers for [facility] and [facility], and call back number if further questions/concerns

## 2019-10-28 NOTE — TELEPHONE ENCOUNTER
Health Call Center    Phone Message    May a detailed message be left on voicemail: yes    Reason for Call: Other: Sae calling to request a call back. He states he faxed a loss of consciousness form about a month ago and it needs to be filled out by Dr. Chiu. Please call him to let him know the status of the form.      Action Taken: Message routed to:  [facility] ([facility]): denisse neuro

## 2019-10-29 ENCOUNTER — TELEPHONE (OUTPATIENT)
Dept: NEUROLOGY | Facility: CLINIC | Age: 39
End: 2019-10-29

## 2019-10-29 NOTE — TELEPHONE ENCOUNTER
Patient had also sent the form to the [facility] clinic.  Checked with the nurse at the [facility], the form has been signed by .  Will close this encounter.

## 2019-10-29 NOTE — TELEPHONE ENCOUNTER
Received patient's DMV Form via fax  DMV Form Completed and placed in MD folder for signature.  DMV Form signed by MD, faxed to [place] Dept of Public Safety, Copy sent to be scanned into EHR, and copy sent to patient at address on file.  Phone call made to patient to inform them.

## 2019-11-25 NOTE — TELEPHONE ENCOUNTER
ONCOLOGY INTAKE: Records Information      APPT INFORMATION: [id number] Abby German  Referring provider:  Self  Referring provider s clinic:  NA  Reason for visit/diagnosis:  Mother had testing done and it was recommended that he has it done too.  Has patient been notified of appointment date and time?: Yes    RECORDS INFORMATION:  Were the records received with the referral (via [employer])? None    Has patient been seen for any external appt for this diagnosis? No    If yes, where? NA    Has patient had any imaging or procedures outside of [place]  [place] for this condition? No      If Yes, where? NA    ADDITIONAL INFORMATION:  None

## 2019-12-09 NOTE — PROGRESS NOTES
2019    Referring Provider: Self-referred    Presenting Information:   I met with Sae Yip today for genetic counseling at the Cancer Risk Management Program at the [facility] to discuss his family history of mesothelioma and a BAP1 mutation, as well as breast cancer.  He is here today with his wife Laura to review this history, cancer screening recommendations, and available genetic testing options.    Personal History:  Sae is a 39 year old male. He does not have any personal history of cancer.       Sae has not had a colonoscopy. Other than dermatological exams, he does not regularly do any other cancer screening at this time. Sae reported no tobacco use and no alcohol use.    Family History: (Please see scanned pedigree for detailed family history information)    His mother had genetic testing via [facility] in 2019 and tested positive for a mutation in BAP1. Specifically, her mutation is called [id number] ([id number]). A copy of her report was available today.    His maternal aunt had breast cancer at 51; she is currently 66. By report, she is having genetic testing done now.     His maternal grandfather had mesothelioma around 68 and he passed away at 70. He had two younger sisters (Sae's great-aunts) who  of cancer but the type was unknown. His grandfather's father also passed away from cancer, possibly mesothelioma.     There is no known cancer history on his paternal side of the family.    His maternal ethnicity is English. His paternal ethnicity is Burmese.  There is no known Ashkenazi Roman Catholic ancestry on either side of his family. There is no reported consanguinity.    Discussion:    We reviewed the features of sporadic, familial, and hereditary cancers. For his family, a BAP1 mutation is associated with a diagnosis of BAP1 Tumor Predisposition Syndrome.  As this is a rare syndrome, there are currently no lifetime cancer risk estimates for the  BAP1-associated cancers. There is research, though, that has identified how many individuals with a known BAP1 gene mutation develop one of the associated cancers or features. This data is based on under 200 individuals in the medical literature. As new information becomes available, more concrete lifetime cancer risks may also become available. We reviewed that his maternal grandfather and great-grandfather's mesothelioma may have been caused by the BAP1 mutation found in his mother, but without confirmation we cannot be sure.    We discussed that Sae's chance that he inherited his mother's BAP1 mutation is 50%. He asked about his daughters and we reviewed that if he is positive, each daughter has a 50/50 chance to inherit the same BAP1 mutation.     Sae brought in information from [employer] about BAP1 Tumor Predisposition Syndrome. We reviewed the association with this syndrome and atypical Spitz tumors, uveal melanoma, mesothelioma, cutaneous melanoma, kidney cancer and the risk for other cancers such as breast, thyroid, meningioma, and neuroendocrine tumors. Currently there are no estimates regarding the prevalence or lifetime risk of these cancers.    Sae and Laura had questions about testing for their daughters and screening. Because screening for uveal melanoma typically begins at 11, genetic testing for children can occur at 11. We discussed that additional screening recommendations at [facility] are dermatological exams for cutaneous melanoma that start at age 20, and that kidney cancer screening (abdominal ultrasounds and urine analysis) typically starts at age 20. Annual physicals are recommended starting at age 11 or age of diagnosis. Annual abdominal ultrasound starting at 20 are recommended, along with alternating chest and abdominal MRI imaging peritoneum, pleura and kidneys.   Based on his aunt's breast cancer at 51, we briefly discussed Hereditary Breast and  Ovarian Cancer (HBOC), which is caused by a mutation in the BRCA1 or BRCA2 gene.  HBOC typically presents with multiple family members diagnosed with breast cancer before age 50 and/or ovarian cancer. Other cancer risks associated with HBOC include male breast cancer, prostate cancer, pancreatic cancer, and melanoma.     A handout regarding BAP1 and uveal melanoma from the National Comprehensive Cancer Network (NCCN) guidelines was provided to Sae at the end of our appointment today.     We discussed that Sae could opt for single-site analysis of the BAP1 gene or decide to have an expanded panel, as there are additional genes that could cause increased risk for breast cancer. As many of these genes present with overlapping features in a family and accurate cancer risk cannot always be established based upon the pedigree analysis alone, it would be reasonable for Sae to consider panel genetic testing to analyze multiple genes at once.    Sae opted to not proceed with genetic testing today as he needed to leave for work, as well as talk with his mother to see if she had an expanded panel of genes tested or just the BAP1. We discussed that if she had an expanded panel and was negative, Sae would likely just need BAP1 testing, given his father's side has no known cancers.     He requested a return visit at [facility] [place] at which time he would sign consent forms and have a blood draw. He mentioned that [employer] has a free family testing program until January 13 based on his mother's test and asked if I could try to schedule a return before that date. I verbalized that I would do my best.     Medical Management: For Sae, we reviewed that the information from future genetic testing may determine:    additional cancer screening for which Sae may qualify (i.e., more frequent dermatologic exams, eye exams, kidney screening exams, etc.),    and targeted chemotherapies for Sae if  he were to develop certain cancers in the future (i.e. immunotherapy for individuals with Jerry syndrome, PARP inhibitors, etc.). This would apply if Sae did expanded testing and were positive for a cancer susceptibility mutation for another syndrome.     These recommendations and possible targeted chemotherapies will be discussed in detail once genetic testing is completed.     Plan:  1) Today Sae elected to wait to have genetic testing.  2) He plans to return to [facility] before January 13, 2020, to discuss BAP1 and possibly expanded testing.   3) Per our discussion, I also called him today and left a voicemail about the screening for BAP1 Tumor Predisposition Syndrome done at [facility] mentioned above.     Face to face time: 40 minutes    Abby German MS, [facility]  Licensed genetic counselor  [phone]

## 2019-12-11 ENCOUNTER — PRE VISIT (OUTPATIENT)
Dept: ONCOLOGY | Facility: CLINIC | Age: 39
End: 2019-12-11

## 2019-12-11 ENCOUNTER — OFFICE VISIT (OUTPATIENT)
Dept: ONCOLOGY | Facility: CLINIC | Age: 39
End: 2019-12-11
Attending: GENETIC COUNSELOR, MS
Payer: COMMERCIAL

## 2019-12-11 DIAGNOSIS — Z80.3 FAMILY HISTORY OF MALIGNANT NEOPLASM OF BREAST: Primary | ICD-10-CM

## 2019-12-11 DIAGNOSIS — Z84.81 FAMILY HISTORY OF GENE MUTATION: ICD-10-CM

## 2019-12-11 PROCEDURE — 96040 ZZH GENETIC COUNSELING, EACH 30 MINUTES: CPT | Mod: ZF | Performed by: GENETIC COUNSELOR, MS

## 2019-12-11 NOTE — LETTER
Cancer Risk Management  Program Locations    [facility]  [facility]  [facility]  Mailing Address  Cancer Risk Management Program  [facility]  [address] [place] [address]    New patient appointments  [phone]  December 12, 2019    Sae Yip  [address]  [facility] [facility] [address]      Dear Sae,    It was a pleasure meeting with you at the [facility] on 12/11/2019.  Here is a copy of the progress note from your recent genetic counseling visit to the Cancer Risk Management Program.  If you have any additional questions, please feel free to call.    Referring Provider: Self-referred    Presenting Information:   I met with Sae Yip today for genetic counseling at the Cancer Risk Management Program at the [facility] to discuss his family history of mesothelioma and a BAP1 mutation, as well as breast cancer.  He is here today with his wife Laura to review this history, cancer screening recommendations, and available genetic testing options.    Personal History:  Sae is a 39 year old male. He does not have any personal history of cancer.       Sae has not had a colonoscopy. Other than dermatological exams, he does not regularly do any other cancer screening at this time. Sae reported no tobacco use and no alcohol use.    Family History: (Please see scanned pedigree for detailed family history information)    His mother had genetic testing via [facility] in September 2019 and tested positive for a mutation in BAP1. Specifically, her mutation is called c.625dupG (p.[id number]). A copy of her report was available today.    His maternal aunt had breast cancer at 51; she is currently 66. By report, she is having genetic testing done now.     His maternal grandfather had  mesothelioma around 68 and he passed away at 70. He had two younger sisters (Sae's great-aunts) who  of cancer but the type was unknown. His grandfather's father also passed away from cancer, possibly mesothelioma.     There is no known cancer history on his paternal side of the family.    His maternal ethnicity is English. His paternal ethnicity is Italian.  There is no known Ashkenazi Adventist ancestry on either side of his family. There is no reported consanguinity.    Discussion:    We reviewed the features of sporadic, familial, and hereditary cancers. For his family, a BAP1 mutation is associated with a diagnosis of BAP1 Tumor Predisposition Syndrome.  As this is a rare syndrome, there are currently no lifetime cancer risk estimates for the BAP1-associated cancers. There is research, though, that has identified how many individuals with a known BAP1 gene mutation develop one of the associated cancers or features. This data is based on under 200 individuals in the medical literature. As new information becomes available, more concrete lifetime cancer risks may also become available. We reviewed that his maternal grandfather and great-grandfather's mesothelioma may have been caused by the BAP1 mutation found in his mother, but without confirmation we cannot be sure.    We discussed that Sae's chance that he inherited his mother's BAP1 mutation is 50%. He asked about his daughters and we reviewed that if he is positive, each daughter has a 50/50 chance to inherit the same BAP1 mutation.     Sae brought in information from Gene Reviews and Genetics Home Reference about BAP1 Tumor Predisposition Syndrome. We reviewed the association with this syndrome and atypical Spitz tumors, uveal melanoma, mesothelioma, cutaneous melanoma, kidney cancer and the risk for other cancers such as breast, thyroid, meningioma, and neuroendocrine tumors. Currently there are no estimates regarding the prevalence or lifetime  risk of these cancers.    Sae and Laura had questions about testing for their daughters and screening. Because screening for uveal melanoma typically begins at 11, genetic testing for children can occur at 11. We discussed that additional screening recommendations at [facility] are dermatological exams for cutaneous melanoma that start at age 20, and that kidney cancer screening (abdominal ultrasounds and urine analysis) typically starts at age 20. Annual physicals are recommended starting at age 11 or age of diagnosis. Annual abdominal ultrasound starting at 20 are recommended, along with alternating chest and abdominal MRI imaging peritoneum, pleura and kidneys.   Based on his aunt's breast cancer at 51, we briefly discussed Hereditary Breast and Ovarian Cancer (HBOC), which is caused by a mutation in the BRCA1 or BRCA2 gene.  HBOC typically presents with multiple family members diagnosed with breast cancer before age 50 and/or ovarian cancer. Other cancer risks associated with HBOC include male breast cancer, prostate cancer, pancreatic cancer, and melanoma.     A handout regarding BAP1 and uveal melanoma from the National Comprehensive Cancer Network (NCCN) guidelines was provided to Sae at the end of our appointment today.     We discussed that Sae could opt for single-site analysis of the BAP1 gene or decide to have an expanded panel, as there are additional genes that could cause increased risk for breast cancer. As many of these genes present with overlapping features in a family and accurate cancer risk cannot always be established based upon the pedigree analysis alone, it would be reasonable for Sae to consider panel genetic testing to analyze multiple genes at once.    Sae opted to not proceed with genetic testing today as he needed to leave for work, as well as talk with his mother to see if she had an expanded panel of genes tested or just the BAP1. We discussed that if she had an  expanded panel and was negative, Sae would likely just need BAP1 testing, given his father's side has no known cancers.     He requested a return visit at [facility] at which time he would sign consent forms and have a blood draw. He mentioned that [facility] has a free family testing program until January 13 based on his mother's test and asked if I could try to schedule a return before that date. I verbalized that I would do my best.     Medical Management: For Sae, we reviewed that the information from future genetic testing may determine:    additional cancer screening for which Sae may qualify (i.e., more frequent dermatologic exams, eye exams, kidney screening exams, etc.),    and targeted chemotherapies for Sae if he were to develop certain cancers in the future (i.e. immunotherapy for individuals with Jerry syndrome, PARP inhibitors, etc.).  This would apply if Sae did expanded testing and were positive for a cancer susceptibility mutation for another syndrome.     These recommendations and possible targeted chemotherapies will be discussed in detail once genetic testing is completed.     Plan:  1) Today Sae elected to wait to have genetic testing.  2) He plans to return to [facility] before January 13, 2020, to discuss BAP1 and possibly expanded testing.   3) Per our discussion, I also called him today and left a voicemail about the screening for BAP1 Tumor Predisposition Syndrome done at [facility] mentioned above.     Abby German MS, [facility]  Licensed genetic counselor  [phone]

## 2020-01-02 NOTE — PROGRESS NOTES
1/9/2020    Referring Provider: Self-referred    Presenting Information:   Sae Yip came in to the [facility] for a follow-up visit today. He had previously been seen in the Cancer Risk Management Program at the [facility] on 12/11/2019. Sae came to clinic today to have his blood drawn for BAP1 single site testing.    Discussion:    We previously reviewed the details of Sae's family and personal history. Please see the clinic note from our previous appointment for details (12/11/2019).     Genetic testing is available for: Single site testing for BAP1: c.625dupG (p.[id number]).      Consent was obtained and genetic testing for CustomNext-Cancer Single Site Analysis was sent to [facility].     Medical Management: For Sae, we reviewed that the information from genetic testing may determine:    additional cancer screening for which Sae may qualify (i.e., more frequent dermatologic exams, ophthalmology exams, kidney cancer screening, abdominal ultrasounds, etc.)    These recommendations will be discussed in detail once genetic testing is completed.     Plan:  1. All of Sae's questions were answered to his satisfaction.   2. Sae signed a consent form at the conclusion of this visit and had his blood drawn for CustomNext-Cancer for BAP1 single site testing. Of note, his mother had testing at [facility] and under the 90 day family variant plan, Sae's testing of the BAP1 familial mutation is expected to be covered.   3. Sae will schedule a phone call to discuss the results. Turn around time: 3-4 weeks       Face to face time: 30 minutes    Abby German MS, [facility]  Licensed genetic counselor  [phone]

## 2020-01-09 ENCOUNTER — ONCOLOGY VISIT (OUTPATIENT)
Dept: ONCOLOGY | Facility: CLINIC | Age: 40
End: 2020-01-09
Attending: GENETIC COUNSELOR, MS
Payer: COMMERCIAL

## 2020-01-09 DIAGNOSIS — Z84.81 FAMILY HISTORY OF GENE MUTATION: Primary | ICD-10-CM

## 2020-01-09 DIAGNOSIS — Z80.3 FAMILY HISTORY OF MALIGNANT NEOPLASM OF BREAST: ICD-10-CM

## 2020-01-09 LAB — MISCELLANEOUS TEST: NORMAL

## 2020-01-09 PROCEDURE — 36415 COLL VENOUS BLD VENIPUNCTURE: CPT

## 2020-01-09 PROCEDURE — 40000072 ZZH STATISTIC GENETIC COUNSELING, < 16 MIN: Performed by: GENETIC COUNSELOR, MS

## 2020-01-09 NOTE — Clinical Note
Please send the patient a copy of this letter. Thank you!    Abby German MS, [facility]

## 2020-01-09 NOTE — LETTER
1/9/2020         RE: Sae Yip  [address] [facility] [address]        Dear Colleague,    Thank you for referring your patient, Sae Yip, to the CANCER RISK MANAGEMENT PROGRAM. Please see a copy of my visit note below.    1/9/2020    Referring Provider: Self-referred    Presenting Information:   Sae Yip came in to the [facility] for a follow-up visit today. He had previously been seen in the Cancer Risk Management Program at the [facility] on 12/11/2019. Sae came to clinic today to have his blood drawn for BAP1 single site testing.    Discussion:    We previously reviewed the details of Sae's family and personal history. Please see the clinic note from our previous appointment for details (12/11/2019).     Genetic testing is available for: Single site testing for BAP1: c.625dupG (p.[id number]).      Consent was obtained and genetic testing for CustomNext-Cancer Single Site Analysis was sent to [facility].     Medical Management: For Sae, we reviewed that the information from genetic testing may determine:    additional cancer screening for which Sae may qualify (i.e., more frequent dermatologic exams, ophthalmology exams, kidney cancer screening, abdominal ultrasounds, etc.)    These recommendations will be discussed in detail once genetic testing is completed.     Plan:  1. All of Sae's questions were answered to his satisfaction.   2. Sae signed a consent form at the conclusion of this visit and had his blood drawn for CustomNext-Cancer for BAP1 single site testing. Of note, his mother had testing at [facility] and under the 90 day family variant plan, Sae's testing of the BAP1 familial mutation is expected to be covered.   3. Sae will schedule a phone call to discuss the results. Turn around time: 3-4 weeks       Face to face time: 30 minutes    Abby German MS, [facility]  Licensed genetic  counselor  [phone]      Medical Assistant Note:  Sae Yip presents today for lab draw.    Patient seen by provider today: Yes: Abby GILBERT.   present during visit today: Not Applicable.    Concerns: No Concerns.    Procedure:  Lab draw site: RAC, Needle type: BF, Gauge: 21. Gauze and coban applied    Post Assessment:  Labs drawn without difficulty: Yes.    Discharge Plan:  Departure Mode: Ambulatory.    Face to Face Time: 5.    Sheila Jaquez CMA              Again, thank you for allowing me to participate in the care of your patient.        Sincerely,        LETY German GC

## 2020-01-09 NOTE — PATIENT INSTRUCTIONS
Conditions discussed today:    BAP1 single site testing and BAP1 Tumor Predisposition Syndrome    Assessing Cancer Risk  Only about 5-10% of cancers are thought to be due to an inherited cancer susceptibility gene.    These families often have:    Several people with the same or related types of cancer    Cancers diagnosed at a young age (before age 50)    Individuals with more than one primary cancer    Multiple generations of the family affected with cancer    Genetic Testing  Genetic testing involves a simple blood test and will look at the genetic information in select genes for any harmful mutations that are associated with increased cancer risk.  If possible, it is recommended that the person(s) who has had cancer be tested before other family members.  That person will give us the most useful information about whether or not a specific gene is associated with the cancer in the family.     Results  There are three possible results of genetic testing:    Positive--a harmful mutation was identified    Negative--no mutation was identified    Variant of unknown significance--a variation in one of the genes was identified, but it is unclear how this impacts cancer risk in the family    Advantages and Disadvantages  There are advantages and disadvantages to genetic testing of these genes.    Advantages    May clarify your cancer risk    Can help you make medical decisions    May explain the cancers in your family    May give useful information to your family members (if you share your results)    Disadvantages    Possible negative emotional impact of learning about inherited cancer risk    Uncertainty in interpreting a negative test result in some situations    Possible genetic discrimination concerns (see below)    Inheritance   Mutations in most cancer risk genes are inherited in an autosomal dominant pattern.  This means that if a parent has a mutation, each of his or her children will have a 50% chance  of inheriting that same mutation.  Therefore, each child--male or female--would have a 50% chance of being at increased risk for developing cancer.                                              Image obtained from Genetics Home Reference, 2013     Genetic Information Nondiscrimination Act (BEAU)  BEAU is a federal law that protects individuals from health insurance or employment discrimination based on a genetic test result alone.  Although rare, there are currently no legal protections in terms of life insurance, long term care, or disability insurances.  Visit the National Human [employer] [place] at Genome.gov/62389266 to learn more.    Reducing Cancer Risk  If a harmful mutation is found in a cancer risk gene, there may be certain screens or preventative surgeries that can be offered. This information will be discussed after genetic testing is completed. If no mutations are found on genetic testing, screening is then recommended based on personal and/or family history of cancer.     Questions to Think About Regarding Genetic Testing    What effect will the test result have on me and my relationship with my family members if I have an inherited gene mutation?  If I don t have a gene mutation?    Should I share my test results, and how will my family react to this news, which may also affect them?    Are my children ready to learn new information that may one day affect their own health?    Resources  American Cancer Society (ACS) cancer.org   National Cancer [place] (NCI) cancer.gov     Please call us if you have any questions or concerns.   Cancer Risk Management Program [phone] ([phone])  ? Ezekiel Rowland, MS, [facility] [phone]  ? Brit Chua, MS, [facility]  [phone]  ? Leisa Owusu, MS, [facility]  [phone]  ? Abby German, MS, [facility] [phone]  ? Anastasia Diaz, MS, [facility] [phone]  ? Shawna Tipton, MS, [facility] [phone]  ? Lucia Guaman, MS, [facility]  [phone]

## 2020-01-09 NOTE — LETTER
Cancer Risk Management  Program Locations    [facility]  Mailing Address  Cancer Risk Management Program  [facility]  [address] [place] [address]    New patient appointments  [phone]  January 10, 2020    Sae Yip  [address]  [place] [facility] [address]      Dear Sae,    It was a pleasure meeting with you at the [facility] on 1/9/2020.  Here is a copy of the progress note from your recent genetic counseling visit to the Cancer Risk Management Program.  If you have any additional questions, please feel free to call.    Referring Provider: Self-referred    Presenting Information:   Sae Yip came in to the [facility] for a follow-up visit today. He had previously been seen in the Cancer Risk Management Program at the [facility] on 12/11/2019. Sae came to clinic today to have his blood drawn for BAP1 single site testing.    Discussion:    We previously reviewed the details of Sae's family and personal history. Please see the clinic note from our previous appointment for details (12/11/2019).     Genetic testing is available for: Single site testing for BAP1: c.625dupG (p.[id number]).      Consent was obtained and genetic testing for CustomNext-Cancer Single Site Analysis was sent to [facility].     Medical Management: For Sae, we reviewed that the information from genetic testing may determine:    additional cancer screening for which Sae may qualify (i.e., more frequent dermatologic exams, ophthalmology exams, kidney cancer screening, abdominal ultrasounds, etc.)    These recommendations will be discussed in detail once genetic testing is completed.     Plan:  1. All of Sae's  questions were answered to his satisfaction.   2. Sae signed a consent form at the conclusion of this visit and [facility]. Of note, his mother had testing at [facility] and under the 90 day family variant plan, Sae's testing of the BAP1 familial mutation is expected to be covered.   3. Sae will schedule a phone call to discuss the results. Turn around time: 3-4 weeks       Abby Germna MS, [facility]  Licensed genetic counselor  [phone]

## 2020-01-16 LAB — LAB SCANNED RESULT: ABNORMAL

## 2020-02-28 ENCOUNTER — TELEPHONE (OUTPATIENT)
Dept: ONCOLOGY | Facility: CLINIC | Age: 40
End: 2020-02-28

## 2020-02-28 NOTE — TELEPHONE ENCOUNTER
I left a brief voicemail for Sae Yip today regarding a return visit to discuss his genetic testing results. I saw Sae at the [facility] on 1/09/2020 because of a family history of several cancers and a familial BAP1 mutation.    Sae's blood was drawn on 1/09/2020 and CustomNext-Cancer Single Site Analysis test was ordered through [facility].     I left a brief, general voicemail today that the schedulers have been trying to reach him. I offered to chat with him over the phone to discuss his test results. I encouraged Sae to contact me with some available times at [phone].    Abby German MS, [facility]  Licensed genetic counselor  [phone]

## 2020-03-02 ENCOUNTER — HEALTH MAINTENANCE LETTER (OUTPATIENT)
Age: 40
End: 2020-03-02

## 2020-03-02 ENCOUNTER — TELEPHONE (OUTPATIENT)
Dept: ONCOLOGY | Facility: CLINIC | Age: 40
End: 2020-03-02

## 2020-03-02 NOTE — TELEPHONE ENCOUNTER
I returned a call Sae Yip today regarding a return telephone visit. I saw Sae at the [facility] on 1/9/2020 because of a family history of mesothelioma and a BAP1 mutation, as well as breast cancer.    Sae's blood was drawn on 1/09/2020 and [facility] to [facility].     I left a brief, general voicemail today that I can speak with him tomorrow, Tuesday, March 3 at 11:00 a.m. or at 1:00 p.m.     I encouraged Sae to contact me to confirm either time at [phone].    Abby German MS, [facility]  Licensed genetic counselor  [phone]

## 2020-03-03 ENCOUNTER — TELEPHONE (OUTPATIENT)
Dept: ONCOLOGY | Facility: CLINIC | Age: 40
End: 2020-03-03

## 2020-03-03 DIAGNOSIS — Z80.3 FAMILY HISTORY OF MALIGNANT NEOPLASM OF BREAST: ICD-10-CM

## 2020-03-03 DIAGNOSIS — Z84.81 FAMILY HISTORY OF GENE MUTATION: Primary | ICD-10-CM

## 2020-03-03 NOTE — LETTER
Cancer Risk Management  Program [facility]  [facility]  [facility]  [facility]  Mailing Address  Cancer Risk Management Program  [facility]  [address] [place] [address]    New patient appointments  [phone]  March 3, 2020    Sae Yip  [address]  [facility] [address]      Dear Sae,    Thank you for speaking with me on the phone on 3/3/2020. Here is a copy of the progress note from our discussion. If you have any additional questions, please feel free to call.  ?   Referring Provider: Self-referred   ?   Presenting Information:   I spoke with Sae today on the phone to discuss his genetic testing results. His blood was drawn on 1/09/2020 and we ordered CustomReady Solart-Cancer Single Site Analysis test from [facility] This testing was done because of his family history of mesothelioma and a BAP1 mutation, as well as breast cancer.     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 This is the same mutation found in his mother. We discussed that this mutation is associated with a diagnosis of BAP1 Tumor Predisposition Syndrome and increased risk for atypical Spitz tumors, uveal melanoma, mesothelioma, cutaneous melanoma, kidney cancer and the risk for other cancers such as breast, thyroid, meningioma, and neuroendocrine tumors. We discussed the impact of this testing on Sae in detail.   ?   Cancer Risks:   Mutations in the BAP1 gene are associated with a diagnosis of BAP1 Tumor Predisposition Syndrome. As this is a rare syndrome, there are currently no lifetime cancer risk estimates for the BAP1-associated cancers. There is research, though, that has estimated how many individuals with a known BAP1 gene  mutation develop one of the associated cancers or features (Armando et al. Clin Melanie. 2016; Theodore et al. JNCI. 2018). As new information becomes available, more concrete lifetime cancer risks may become available.   The associated cancers/tumors include:     Atypical Spitz tumors - the majority of individuals with BAP1 mutations     These tumors are typically benign skin tumors that can be reddish/brown or skin colored     They are also known as benign melanocytic BAP1-mutated atypical intradermal tumors     Uveal (eye) melanoma - approximately 24.7-31% of individuals with BAP1 mutations     Mesothelioma - approximately 14-22% of individuals with BAP1 mutations     This mesothelioma is most likely to develop in the pleura, though there is increased risk for peritoneal mesothelioma, as well     The relationship between BAP1 mutations, asbestos exposure, and the development of mesothelioma is currently unclear     Cutaneous (skin) melanoma - approximately 13-36% of individuals with BAP1 mutations     Kidney cancer (typically clear cell) - approximately 10-12% of individuals with BAP1 mutations     Basal cell carcinoma - the exact prevalence is unknown     There may also be increased risks for other cancers, including cholangiocarcinoma, breast, thyroid, meningioma, and neuroendocrine tumors. Currently there are no estimates regarding the prevalence or lifetime risk of these cancers.     In general, approximately 84-87% of individuals with a BAP1 mutation develop at least one associated tumor in their lifetime.     BAP1 Cancer Screening and Prevention:   The following screening is currently recommended for individuals who have a mutation in the BAP1 gene. These recommendations are based on those proposed by valerie HINTON Rai al. (Clin Melanie. 2016).     Annual dilated eye exam, ideally with an ocular oncologist - begin at age 11     Annual dermatologic exams - begin at age 20     Self skin exams following ABCDE melanoma  characteristics     Regular use of sun and eye protection     Consideration of annual abdominal ultrasound and urine analysis - age at initiation of screening unknown but could begin at age of diagnosis or based on family history     Consider abdominal MRI every two years (with possible inclusion of pleura and peritoneum) - age at initiation of screening unknown but could begin at age of diagnosis or based on family history     Consider annual physical with particular attention to symptoms of mesothelioma - age at initiation of screening unknown but could begin at age of diagnosis or based on family history.    Other screening based on Sae's personal and family history:   Sae s close female relatives remain at increased risk for breast cancer given their family history. Breast cancer screening is generally recommended to begin approximately 10 years younger than the earliest age of breast cancer diagnosis in the family, or at age 40, whichever comes first. In this family, screening may begin at age 40. Breast screening options should be discussed with an individual's primary care provider and a genetic counselor, to determine at what age to begin screening, what screening is appropriate, and if additional screening (such as breast MRI) is necessary based on personal/family history factors.   Other population cancer screening options, such as those recommended by the American Cancer Society and the National Comprehensive Cancer Network (NCCN), are also appropriate for Sae and his family. These screening recommendations may change if there are changes to Sae's personal and/or family history of cancer. Final screening recommendations should be made by each individual's managing physician.    We discussed that Sae could participate in our Cancer Risk Management Program in which our nursing specialist provides an individual screening plan and assists with medical management. Sae requested a phone  call with Kinjal first if possible. A referral was placed to talk with BIRDIE Hooks for this service.   ?   Of note, the above information is based on our current understanding of Sae's genetic findings. Sae is encouraged to reach out to me regularly regarding any pertinent updates to his personal and/or family history of cancer, as our understanding of the genetic findings in his family may change over time.     Implications for Family Members:   We reviewed that mutations in the BAP1 gene are inherited in an autosomal dominant pattern. This means that each of Sae's children have a 50% chance of inheriting the same mutation. Likewise, his brother has a 50% risk of having the same mutation. Extended relatives may also carry this mutation, and he is encouraged to share this information with his family members on both sides of the family. I am happy to help his relatives connect with a genetic counselor in their area if they would like to discuss testing.     Of note, guidelines recommend testing children for BAP1 mutations before 11 so that they may begin screening, especially for their eyes. If Sae and his wife would like to have his daughters tested, genetic counselor Brit Chua [facility], sees children in the pediatric oncology department. The number to schedule an appointment with Brit is the general scheduling line at [phone].     Additional Testing Considerations:   It is possible Sae does carry another gene mutation or combination of genes and environment that increase his risk for breast cancer. There is the option of larger gene panels covering more moderate risk genes associated with breast cancer. Sae is encouraged to contact me if he wishes to readdress these larger gene panel options.   ?   Even though Sae's genetic testing result was positive, other relatives may carry a different gene mutation associated with breast cancer. Genetic counseling is recommended for  "his maternal aunt to discuss genetic testing options. If she does pursue genetic testing, Sae is encouraged to contact me so that we may review the impact of their test results on him.     Plan:   1. I will send Sae with a copy of his test results.   2. I will provide a \"Dear Relative\" letter for Sae to share with his family members.   3. He plans to follow up with his medical providers.   4. A referral was placed for Sae to meet with BIRDIE Hooks to discuss screening associated with a BAP1 mutation.   ?   If Sae has additional questions, I encouraged him to contact me directly at [phone].   ?  ?   Abby German MS, [facility]   Licensed genetic counselor   [phone]   ?  "

## 2020-03-03 NOTE — TELEPHONE ENCOUNTER
"3/3/2020   ?   Referring Provider: Self-referred   ?   Presenting Information:   I spoke with Sae today on the phone to discuss his genetic testing results. His blood was drawn on 1/09/2020 and we ordered CustomNext-Cancer Single Site Analysis test from [facility]. This testing was done because of his family history of mesothelioma and a BAP1 mutation, as well as breast cancer.     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 This is the same mutation found in his mother. We discussed that this mutation is associated with a diagnosis of BAP1 Tumor Predisposition Syndrome and increased risk for atypical Spitz tumors, uveal melanoma, mesothelioma, cutaneous melanoma, kidney cancer and the risk for other cancers such as breast, thyroid, meningioma, and neuroendocrine tumors. We discussed the impact of this testing on Sae in detail.   ?   A copy of the test report can be found in the Laboratory tab, dated 1/09/2020, and named \"SEND OUTS MISC TEST\". The report is scanned in as a linked document.   ?   Cancer Risks:   Mutations in the BAP1 gene are associated with a diagnosis of BAP1 Tumor Predisposition Syndrome. As this is a rare syndrome, there are currently no lifetime cancer risk estimates for the BAP1-associated cancers. There is research, though, that has estimated how many individuals with a known BAP1 gene mutation develop one of the associated cancers or features (Roberts et al. Clin Melanie. 2016; Theodore et al. JNCI. 2018). As new information becomes available, more concrete lifetime cancer risks may become available.   The associated cancers/tumors include:     Atypical Spitz tumors - the majority of individuals with BAP1 mutations     These tumors are typically benign skin tumors that can be reddish/brown or skin colored     They are also known as benign melanocytic BAP1-mutated atypical intradermal tumors "     Uveal (eye) melanoma - approximately 24.7-31% of individuals with BAP1 mutations     Mesothelioma - approximately 14-22% of individuals with BAP1 mutations     This mesothelioma is most likely to develop in the pleura, though there is increased risk for peritoneal mesothelioma, as well     The relationship between BAP1 mutations, asbestos exposure, and the development of mesothelioma is currently unclear     Cutaneous (skin) melanoma - approximately 13-36% of individuals with BAP1 mutations     Kidney cancer (typically clear cell) - approximately 10-12% of individuals with BAP1 mutations     Basal cell carcinoma - the exact prevalence is unknown     There may also be increased risks for other cancers, including cholangiocarcinoma, breast, thyroid, meningioma, and neuroendocrine tumors. Currently there are no estimates regarding the prevalence or lifetime risk of these cancers.     In general, approximately 84-87% of individuals with a BAP1 mutation develop at least one associated tumor in their lifetime.     BAP1 Cancer Screening and Prevention:   The following screening is currently recommended for individuals who have a mutation in the BAP1 gene. These recommendations are based on those proposed by JAMAAL Roberts et al. (Clin Melanie. 2016).     Annual dilated eye exam, ideally with an ocular oncologist - begin at age 11     Annual dermatologic exams - begin at age 20     Self skin exams following ABCDE melanoma characteristics     Regular use of sun and eye protection     Consideration of annual abdominal ultrasound and urine analysis - age at initiation of screening unknown but could begin at age of diagnosis or based on family history     Consider abdominal MRI every two years (with possible inclusion of pleura and peritoneum) - age at initiation of screening unknown but could begin at age of diagnosis or based on family history     Consider annual physical with particular attention to symptoms of mesothelioma - age  at initiation of screening unknown but could begin at age of diagnosis or based on family history.    Other screening based on Sae's personal and family history:   Sae s close female relatives remain at increased risk for breast cancer given their family history. Breast cancer screening is generally recommended to begin approximately 10 years younger than the earliest age of breast cancer diagnosis in the family, or at age 40, whichever comes first. In this family, screening may begin at age 40. Breast screening options should be discussed with an individual's primary care provider and a genetic counselor, to determine at what age to begin screening, what screening is appropriate, and if additional screening (such as breast MRI) is necessary based on personal/family history factors.   Other population cancer screening options, such as those recommended by the American Cancer Society and the National Comprehensive Cancer Network (NCCN), are also appropriate for Sae and his family. These screening recommendations may change if there are changes to Sae's personal and/or family history of cancer. Final screening recommendations should be made by each individual's managing physician.    We discussed that Sae could participate in our Cancer Risk Management Program in which our nursing specialist provides an individual screening plan and assists with medical management. Sae requested a phone call with Kinjal bermudez if possible. A referral was placed to talk with BIRDIE Hooks for this service.   ?   Of note, the above information is based on our current understanding of Sae's genetic findings. Sae is encouraged to reach out to me regularly regarding any pertinent updates to his personal and/or family history of cancer, as our understanding of the genetic findings in his family may change over time.     Implications for Family Members:   We reviewed that mutations in the BAP1 gene  "are inherited in an autosomal dominant pattern. This means that each of Sae's children have a 50% chance of inheriting the same mutation. Likewise, his brother has a 50% risk of having the same mutation. Extended relatives may also carry this mutation, and he is encouraged to share this information with his family members on both sides of the family. I am happy to help his relatives connect with a genetic counselor in their area if they would like to discuss testing.     Of note, guidelines recommend testing children for BAP1 mutations before 11 so that they may begin screening, especially for their eyes. If Sae and his wife would like to have his daughters tested, genetic counselor Brit Chua [facility], sees children in the pediatric oncology department. The number to schedule an appointment with Brit is the general scheduling line at [phone].     Additional Testing Considerations:   It is possible Sae does carry another gene mutation or combination of genes and environment that increase his risk for breast cancer. There is the option of larger gene panels covering more moderate risk genes associated with breast cancer. Sae is encouraged to contact me if he wishes to readdress these larger gene panel options.   ?   Even though Sae's genetic testing result was positive, other relatives may carry a different gene mutation associated with breast cancer. Genetic counseling is recommended for his maternal aunt to discuss genetic testing options. If she does pursue genetic testing, Sae is encouraged to contact me so that we may review the impact of their test results on him.     Plan:   1. I will send Sae with a copy of his test results.   2. I will provide a \"Dear Relative\" letter for Sae to share with his family members.   3. He plans to follow up with his medical providers.   4. A referral was placed for Sae to meet with BIRDIE Hooks to discuss screening associated with " a BAP1 mutation.   ?   If Sae has additional questions, I encouraged him to contact me directly at [phone].   ?   Time spent on the phone: 15 minutes   ?   Abby German MS, [facility]   Licensed genetic counselor   [phone]   ?

## 2020-05-12 ENCOUNTER — VIRTUAL VISIT (OUTPATIENT)
Dept: NEUROLOGY | Facility: CLINIC | Age: 40
End: 2020-05-12
Payer: COMMERCIAL

## 2020-05-12 DIAGNOSIS — G40.309 EPILEPTIC SEIZURE, GENERALIZED, CONVULSIVE (H): Primary | ICD-10-CM

## 2020-05-12 DIAGNOSIS — G40.309 GENERALIZED CONVULSIVE EPILEPSY (H): ICD-10-CM

## 2020-05-12 RX ORDER — LEVETIRACETAM 500 MG/1
1000 TABLET ORAL 2 TIMES DAILY
Qty: 360 TABLET | Refills: 3 | Status: SHIPPED | OUTPATIENT
Start: 2020-05-12 | End: 2020-07-10

## 2020-05-12 NOTE — PROGRESS NOTES
"Sae Yip is a 39 year old male who is being evaluated via a billable video visit.      The patient has been notified of following:     \"This video visit will be conducted via a call between you and your physician/provider. We have found that certain health care needs can be provided without the need for an in-person physical exam.  This service lets us provide the care you need with a video conversation.  If a prescription is necessary we can send it directly to your pharmacy.  If lab work is needed we can place an order for that and you can then stop by our lab to have the test done at a later time.    Video visits are billed at different rates depending on your insurance coverage.  Please reach out to your insurance provider with any questions.    If during the course of the call the physician/provider feels a video visit is not appropriate, you will not be charged for this service.\"    Patient has given verbal consent for Video visit? YES    How would you like to obtain your AVS? Miya    Patient would like the video invitation sent by: Email [email or web address]    Will anyone else be joining your video visit? no      TERESSA Perez        EPILEPSY CLINIC VIDEO VISIT NOTE  The scheduled clinic visit was changed to a video visit to reduce the risk of COVID-19 transmission.           Service Date: 05/12/2020    HISTORY: I spoke with . 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      The patient denied having recent fever or cough, and exposure to anyone thought to have COVID-19.     Following the most recent visit to this clinic on 06/25/2019, the patient reportedly has had no seizures and no levetiracetam adverse effects.   The last grand mal seizure occurred in 2018.       MEDICATIONS: Levetiracetam 1000 mg b.i.d.     VIDEO OBSERVATIONS:   The patient spoke with normal articulation, fluency and syntax.  On command, the " patient moved the direction of gaze into all 4 quadrants conjugately and without nystagmus.   Facial movements were normal.    The patient did not display any resting tremors or action tremors of the outstretched arms.  Limb movements were normal.      IMPRESSION:   The patient remains seizure-free on levetiracetam monotherapy, with no adverse effects.    He asked several questions about long-term prognosis, which I addressed.  He understands that seizures could recur if he is not taking levetiracetam as prescribed on a daily basis.     PLAN:   1)  Continue levetiracetam at the current dose.   2)  Return in 11 months for in-person clinic visit.    Video Conference via [employer].   Video Start Time: 5:15 p.m.   Video Stop Time: 5:31 p.m.  Provider location: [facility]   Reported Patient Location: [place]     Zia Chiu M.D., Professor of Neurology

## 2020-07-10 ENCOUNTER — TELEPHONE (OUTPATIENT)
Dept: NEUROLOGY | Facility: CLINIC | Age: 40
End: 2020-07-10

## 2020-07-10 DIAGNOSIS — G40.309 GENERALIZED CONVULSIVE EPILEPSY (H): ICD-10-CM

## 2020-07-10 RX ORDER — LEVETIRACETAM 500 MG/1
1000 TABLET ORAL 2 TIMES DAILY
Qty: 360 TABLET | Refills: 2 | Status: SHIPPED | OUTPATIENT
Start: 2020-07-10 | End: 2020-11-23

## 2020-07-10 NOTE — TELEPHONE ENCOUNTER
[place] [facility] [place] Levetiracetam 500 mg tabs.    Med had been sent to [facility] [address] and several months of refill still available. However per phone conversation with patient that pharmacy is closed and boarded up.  Confirmed closed status on-line.  Called [facility] to request they call for transfer of script, but after 3 attempts and Much time on-hold, re-wrote script for remaining refills and sent it in.

## 2020-11-17 ENCOUNTER — TELEPHONE (OUTPATIENT)
Dept: NEUROLOGY | Facility: CLINIC | Age: 40
End: 2020-11-17

## 2020-11-23 ENCOUNTER — TELEPHONE (OUTPATIENT)
Dept: NEUROLOGY | Facility: CLINIC | Age: 40
End: 2020-11-23

## 2020-11-23 DIAGNOSIS — G40.309 GENERALIZED CONVULSIVE EPILEPSY (H): ICD-10-CM

## 2020-11-23 RX ORDER — LEVETIRACETAM 500 MG/1
1000 TABLET ORAL 2 TIMES DAILY
Qty: 28 TABLET | Refills: 0 | Status: SHIPPED | OUTPATIENT
Start: 2020-11-23 | End: 2021-04-13

## 2020-11-23 NOTE — TELEPHONE ENCOUNTER
Pending Prescriptions:                       Disp   Refills    levETIRAcetam (KEPPRA) 500 MG tablet      360 ta*2            Sig: Take 2 tablets (1,000 mg) by mouth 2 times daily    Last Written Prescription Date:  7/10/2020  Last Fill Quantity: 360,   # refills: 2  Last Office Visit : 5/12/2020  Future Office visit:  4/06/2021    Refill sent to [facility] per patient needs.

## 2020-11-23 NOTE — TELEPHONE ENCOUNTER
What is the concern that needs to be addressed by a nurse? Patient left town and forgot his keppra and would like a 1 week supply sent to [facility] in [place], [place] on [address]    May a detailed message be left on voicemail?     Date of last office visi    Message routed to: Kulwant BARAJAS POol

## 2020-12-20 ENCOUNTER — HEALTH MAINTENANCE LETTER (OUTPATIENT)
Age: 40
End: 2020-12-20

## 2021-04-12 DIAGNOSIS — G40.309 GENERALIZED CONVULSIVE EPILEPSY (H): ICD-10-CM

## 2021-04-13 RX ORDER — LEVETIRACETAM 500 MG/1
TABLET ORAL
Qty: 360 TABLET | Refills: 0 | Status: SHIPPED | OUTPATIENT
Start: 2021-04-13 | End: 2021-06-01

## 2021-04-13 NOTE — TELEPHONE ENCOUNTER
LEVETIRACETAM 500 MG TABLET   Take 2 tablets (1,000 mg) by mouth 2 times daily - Oral    Last Written Prescription Date:  11/23/20  Last Fill Quantity: 28,   # refills: 0 ( One time fill to [facility]  In [place])    ( also previous levetiracetam 500 mG #360/ 2 Rf to  [facility] [id number] [place] [facility] [place], [place] - [address]/10[address]20)     Last Office Visit : 5/12/20  Future Office visit:  11 months in person: 5/11/2021     Refilled for 30 days per protocol.

## 2021-04-18 ENCOUNTER — HEALTH MAINTENANCE LETTER (OUTPATIENT)
Age: 41
End: 2021-04-18

## 2021-06-01 ENCOUNTER — VIRTUAL VISIT (OUTPATIENT)
Dept: NEUROLOGY | Facility: CLINIC | Age: 41
End: 2021-06-01
Payer: COMMERCIAL

## 2021-06-01 DIAGNOSIS — G40.309 GENERALIZED CONVULSIVE EPILEPSY (H): ICD-10-CM

## 2021-06-01 PROCEDURE — 99213 OFFICE O/P EST LOW 20 MIN: CPT | Mod: GT | Performed by: PSYCHIATRY & NEUROLOGY

## 2021-06-01 RX ORDER — LEVETIRACETAM 500 MG/1
TABLET ORAL
Qty: 360 TABLET | Refills: 3 | Status: SHIPPED | OUTPATIENT
Start: 2021-06-01 | End: 2022-05-02

## 2021-06-01 NOTE — LETTER
Date:August 12, 2021      Patient was self referred, no letter generated. Do not send.        [facility] Health Information

## 2021-06-01 NOTE — LETTER
6/1/2021       RE: Sae Yip  [address] [facility] [address]     Dear Colleague,    Thank you for referring your patient, Sae Yip, to the [facility]UR[facility] [place] [facility]. Please see a copy of my visit note below.    Sae is a 40 year old who is being evaluated via a billable video visit.       How would you like to obtain your AVS? Mychart  If the video visit is dropped, the invitation should be resent by: [phone]        EPILEPSY CLINIC TELEPHONE VISIT NOTE  The scheduled clinic visit was changed to a telephone visit to reduce the risk of COVID-19 transmission.           Service Date: 06/01/2021    HISTORY: I spoke with . 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       The patient denied having recent fever or cough, and exposure to anyone thought to have COVID-19.      Following the most recent visit to this clinic on 05/12/2020, the patient reportedly has had no seizures and no levetiracetam adverse effects.   The last grand mal seizure occurred in 2018.       MEDICATIONS: Levetiracetam 1000 mg b.i.d.      IMPRESSION:   The patient continues to be seizure-free on levetiracetam monotherapy, with no adverse effects.     He asked several questions about daytime lethargy, which he attributes to occasionally missing sleep the preceding night.  We will check a levetiracetam level to exclude an unexpected rise in the level as a cause of daytime lethargy.       PLAN:   1)  Continue levetiracetam at the current dose.   2)  Check levetiracetam level.  3)  Return in 11 months for video clinic visit.     I personally spent 21 minutes in this patient care on the day of this visit, including 18 minutes in Telephone contact with the patient, and time in other necessary patient care activities without direct patient contact including medical  record, EEG and imaging report review.    Zia Chiu M.D., Professor of Neurology          Again, thank you for allowing me to participate in the care of your patient.      Sincerely,    Zia Chiu MD

## 2021-06-01 NOTE — PROGRESS NOTES
Sae is a 40 year old who is being evaluated via a billable video visit.       How would you like to obtain your AVS? Mychart  If the video visit is dropped, the invitation should be resent by: [phone]        EPILEPSY CLINIC TELEPHONE VISIT NOTE  The scheduled clinic visit was changed to a telephone visit to reduce the risk of COVID-19 transmission.           Service Date: 06/01/2021    HISTORY: I spoke with Mr. 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       The patient denied having recent fever or cough, and exposure to anyone thought to have COVID-19.      Following the most recent visit to this clinic on 05/12/2020, the patient reportedly has had no seizures and no levetiracetam adverse effects.   The last grand mal seizure occurred in 2018.       MEDICATIONS: Levetiracetam 1000 mg b.i.d.      IMPRESSION:   The patient continues to be seizure-free on levetiracetam monotherapy, with no adverse effects.     He asked several questions about daytime lethargy, which he attributes to occasionally missing sleep the preceding night.  We will check a levetiracetam level to exclude an unexpected rise in the level as a cause of daytime lethargy.       PLAN:   1)  Continue levetiracetam at the current dose.   2)  Check levetiracetam level.  3)  Return in 11 months for video clinic visit.     I personally spent 21 minutes in this patient care on the day of this visit, including 18 minutes in Telephone contact with the patient, and time in other necessary patient care activities without direct patient contact including medical record, EEG and imaging report review.    Zia Chiu M.D., Professor of Neurology

## 2021-10-03 ENCOUNTER — HEALTH MAINTENANCE LETTER (OUTPATIENT)
Age: 41
End: 2021-10-03

## 2021-12-16 ENCOUNTER — MYC MEDICAL ADVICE (OUTPATIENT)
Dept: NEUROLOGY | Facility: CLINIC | Age: 41
End: 2021-12-16
Payer: COMMERCIAL

## 2021-12-17 ENCOUNTER — TELEPHONE (OUTPATIENT)
Dept: NEUROLOGY | Facility: CLINIC | Age: 41
End: 2021-12-17
Payer: COMMERCIAL

## 2021-12-17 NOTE — TELEPHONE ENCOUNTER
[place] [employer] DVS Loss of Consciousness form received as a mychart attachment, printed to [employer].  Encounter created and routed to forms pool

## 2022-05-02 ENCOUNTER — VIRTUAL VISIT (OUTPATIENT)
Dept: NEUROLOGY | Facility: CLINIC | Age: 42
End: 2022-05-02
Payer: COMMERCIAL

## 2022-05-02 DIAGNOSIS — G40.309 GENERALIZED CONVULSIVE EPILEPSY (H): ICD-10-CM

## 2022-05-02 DIAGNOSIS — G40.309 EPILEPTIC SEIZURE, GENERALIZED, CONVULSIVE (H): Primary | ICD-10-CM

## 2022-05-02 PROCEDURE — 99213 OFFICE O/P EST LOW 20 MIN: CPT | Mod: GT | Performed by: PSYCHIATRY & NEUROLOGY

## 2022-05-02 RX ORDER — LEVETIRACETAM 500 MG/1
TABLET ORAL
Qty: 360 TABLET | Refills: 3 | Status: SHIPPED | OUTPATIENT
Start: 2022-05-02 | End: 2023-05-09

## 2022-05-02 NOTE — LETTER
5/2/2022       RE: Sae Yip  [address] [facility] [address]     Dear Colleague,    Thank you for referring your patient, Sae Yip, to the [facility] [place] at [facility]. Please see a copy of my visit note below.      Sae is a 41 year old who is being evaluated via a billable video visit.       How would you like to obtain your AVS? MyChart and by Mail  If the video visit is dropped, the invitation should be resent by: Send to e-mail at: [email or web address]  Will anyone else be joining your video visit? No      EPILEPSY CLINIC VIDEO VISIT NOTE  The scheduled clinic visit was changed to a video visit to reduce the risk of COVID-19 transmission.           Service Date: 05/02/2022    HISTORY: I spoke with Mr. 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       The patient denied having recent fever or cough, and exposure to anyone thought to have COVID-19.      Following the most recent virtual visit to this clinic on 06/01/2021, the patient reportedly has had no seizures and no levetiracetam adverse effects.   The last grand mal seizure occurred in 2018.       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     MEDICATIONS: Levetiracetam 1000 mg b.i.d.     VIDEO OBSERVATIONS:   The patient spoke with normal articulation, fluency and syntax, with normal comprehension of questions.    On command, the patient moved the direction of gaze into all 4 quadrants conjugately and without nystagmus.   Facial movements were normal.    Tongue protruded on the midline.    The patient did not display any resting tremors or action tremors of the outstretched arms.  Limb movements were normal.      IMPRESSION:   The patient continues to be seizure-free on levetiracetam  monotherapy, with no adverse effects.     He continues to experience occasional daytime lethargy, which he attributes to occasionally missing sleep the preceding night.  We will check a levetiracetam level to exclude an unexpected rise in the level as a cause of daytime lethargy.       PLAN:   1)  Continue levetiracetam at the current dose.   2)  Check levetiracetam level.  3)  Return in 10 months for in-person clinic visit.    Video Conference via [employer].   Video Start Time: 1:48 p.m.   Video Stop Time: 2:02 p.m.   Provider location: [facility]   Reported Patient Location: Home     I spent 21 minutes in this patient care, with 14 minutes in direct patient contact, and 7 minutes in chart review and document preparation.     Zia Chiu M.D., Professor of Neurology          Again, thank you for allowing me to participate in the care of your patient.      Sincerely,    Zia Chiu MD

## 2022-05-02 NOTE — PROGRESS NOTES
Sae is a 41 year old who is being evaluated via a billable video visit.       How would you like to obtain your AVS? MyChart and by Mail  If the video visit is dropped, the invitation should be resent by: Send to e-mail at: [email or web address]  Will anyone else be joining your video visit? No      EPILEPSY CLINIC VIDEO VISIT NOTE  The scheduled clinic visit was changed to a video visit to reduce the risk of COVID-19 transmission.           Service Date: 05/02/2022    HISTORY: I spoke with . 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       The patient denied having recent fever or cough, and exposure to anyone thought to have COVID-19.      Following the most recent virtual visit to this clinic on 06/01/2021, the patient reportedly has had no seizures and no levetiracetam adverse effects.   The last grand mal seizure occurred in 2018.       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     MEDICATIONS: Levetiracetam 1000 mg b.i.d.     VIDEO OBSERVATIONS:   The patient spoke with normal articulation, fluency and syntax, with normal comprehension of questions.    On command, the patient moved the direction of gaze into all 4 quadrants conjugately and without nystagmus.   Facial movements were normal.    Tongue protruded on the midline.    The patient did not display any resting tremors or action tremors of the outstretched arms.  Limb movements were normal.      IMPRESSION:   The patient continues to be seizure-free on levetiracetam monotherapy, with no adverse effects.     He continues to experience occasional daytime lethargy, which he attributes to occasionally missing sleep the preceding night.  We will check a levetiracetam level to exclude an unexpected rise in the level as a cause of daytime lethargy.       PLAN:   1)  Continue levetiracetam at  the current dose.   2)  Check levetiracetam level.  3)  Return in 10 months for in-person clinic visit.    Video Conference via [employer].   Video Start Time: 1:48 p.m.   Video Stop Time: 2:02 p.m.   Provider location: [facility]   Reported Patient Location: Home     I spent 21 minutes in this patient care, with 14 minutes in direct patient contact, and 7 minutes in chart review and document preparation.     Zia Chiu M.D., Professor of Neurology

## 2022-05-02 NOTE — LETTER
Date:May 20, 2022      Provider requested that no letter be sent. Do not send.       [facility]

## 2022-05-14 ENCOUNTER — HEALTH MAINTENANCE LETTER (OUTPATIENT)
Age: 42
End: 2022-05-14

## 2022-09-04 ENCOUNTER — HEALTH MAINTENANCE LETTER (OUTPATIENT)
Age: 42
End: 2022-09-04

## 2023-01-18 ENCOUNTER — TELEPHONE (OUTPATIENT)
Dept: NEUROLOGY | Facility: CLINIC | Age: 43
End: 2023-01-18

## 2023-01-18 NOTE — TELEPHONE ENCOUNTER
Received DMV Form to be completed. Form saved to [employer], encounter routed.  Gris Sandy CMA

## 2023-01-20 NOTE — TELEPHONE ENCOUNTER
DMV form signed, faxed and mailed to DPS on 01/20/23, sent to scanning, and copy mailed to patient.

## 2023-05-09 ENCOUNTER — OFFICE VISIT (OUTPATIENT)
Dept: NEUROLOGY | Facility: CLINIC | Age: 43
End: 2023-05-09
Payer: COMMERCIAL

## 2023-05-09 VITALS
RESPIRATION RATE: 16 BRPM | DIASTOLIC BLOOD PRESSURE: 78 MMHG | OXYGEN SATURATION: 98 % | HEART RATE: 56 BPM | BODY MASS INDEX: 24.69 KG/M2 | WEIGHT: 177 LBS | SYSTOLIC BLOOD PRESSURE: 133 MMHG

## 2023-05-09 DIAGNOSIS — G40.309 GENERALIZED CONVULSIVE EPILEPSY (H): ICD-10-CM

## 2023-05-09 PROCEDURE — 99214 OFFICE O/P EST MOD 30 MIN: CPT | Mod: GC | Performed by: PSYCHIATRY & NEUROLOGY

## 2023-05-09 RX ORDER — LEVETIRACETAM 500 MG/1
TABLET ORAL
Qty: 360 TABLET | Refills: 3 | Status: SHIPPED | OUTPATIENT
Start: 2023-05-09 | End: 2024-05-14

## 2023-05-09 ASSESSMENT — PAIN SCALES - GENERAL: PAINLEVEL: NO PAIN (0)
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
## 2023-06-03 ENCOUNTER — HEALTH MAINTENANCE LETTER (OUTPATIENT)
Age: 43
End: 2023-06-03

## 2024-05-14 ENCOUNTER — OFFICE VISIT (OUTPATIENT)
Dept: NEUROLOGY | Facility: CLINIC | Age: 44
End: 2024-05-14
Payer: COMMERCIAL

## 2024-05-14 ENCOUNTER — LAB (OUTPATIENT)
Dept: LAB | Facility: CLINIC | Age: 44
End: 2024-05-14
Payer: COMMERCIAL

## 2024-05-14 VITALS
DIASTOLIC BLOOD PRESSURE: 73 MMHG | SYSTOLIC BLOOD PRESSURE: 114 MMHG | OXYGEN SATURATION: 98 % | HEART RATE: 62 BPM | RESPIRATION RATE: 16 BRPM

## 2024-05-14 DIAGNOSIS — G40.309 GENERALIZED CONVULSIVE EPILEPSY (H): ICD-10-CM

## 2024-05-14 DIAGNOSIS — G40.309 GENERALIZED CONVULSIVE EPILEPSY (H): Primary | ICD-10-CM

## 2024-05-14 PROCEDURE — 99000 SPECIMEN HANDLING OFFICE-LAB: CPT | Performed by: PATHOLOGY

## 2024-05-14 PROCEDURE — 80177 DRUG SCRN QUAN LEVETIRACETAM: CPT | Performed by: PSYCHIATRY & NEUROLOGY

## 2024-05-14 PROCEDURE — 36415 COLL VENOUS BLD VENIPUNCTURE: CPT | Performed by: PATHOLOGY

## 2024-05-14 PROCEDURE — 99215 OFFICE O/P EST HI 40 MIN: CPT | Performed by: PSYCHIATRY & NEUROLOGY

## 2024-05-14 RX ORDER — LEVETIRACETAM 500 MG/1
TABLET ORAL
Qty: 360 TABLET | Refills: 3 | Status: SHIPPED | OUTPATIENT
Start: 2024-05-14

## 2024-05-14 ASSESSMENT — PAIN SCALES - GENERAL: PAINLEVEL: NO PAIN (0)
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
## 2024-05-14 NOTE — NURSING NOTE
Chief Complaint   Patient presents with    RECHECK     /73 (BP Location: Right arm, Patient Position: Sitting, Cuff Size: Adult Regular)   Pulse 62   Resp 16   SpO2 98%     PETER KENIA

## 2024-05-15 LAB — LEVETIRACETAM SERPL-MCNC: 20.7 ΜG/ML (ref 10–40)
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
## 2024-06-10 ENCOUNTER — OFFICE VISIT (OUTPATIENT)
Dept: URGENT CARE | Facility: URGENT CARE | Age: 44
End: 2024-06-10
Payer: COMMERCIAL

## 2024-06-10 VITALS
DIASTOLIC BLOOD PRESSURE: 72 MMHG | SYSTOLIC BLOOD PRESSURE: 122 MMHG | TEMPERATURE: 98.1 F | OXYGEN SATURATION: 98 % | HEART RATE: 71 BPM

## 2024-06-10 DIAGNOSIS — J01.90 ACUTE SINUSITIS WITH SYMPTOMS > 10 DAYS: ICD-10-CM

## 2024-06-10 DIAGNOSIS — J20.9 ACUTE BRONCHITIS WITH SYMPTOMS > 10 DAYS: Primary | ICD-10-CM

## 2024-06-10 PROCEDURE — 99203 OFFICE O/P NEW LOW 30 MIN: CPT | Performed by: NURSE PRACTITIONER

## 2024-06-10 RX ORDER — BENZONATATE 200 MG/1
200 CAPSULE ORAL 3 TIMES DAILY PRN
Qty: 20 CAPSULE | Refills: 0 | Status: SHIPPED | OUTPATIENT
Start: 2024-06-10

## 2024-06-10 NOTE — PATIENT INSTRUCTIONS
Tessalon as needed for cough  Augmentin twice a day for 7 days  Push fluids  Lots of handwashing.   Ibuprofen as needed for fever or pain  Delsym or dayquil/nyquil for cough as needed     Rest as able.   Will call if any other labs positive.    F/u in the clinic if symptoms persist or worsen.

## 2024-06-10 NOTE — PROGRESS NOTES
Assessment & Plan     Acute bronchitis with symptoms > 10 days  - benzonatate (TESSALON) 200 MG capsule  Dispense: 20 capsule; Refill: 0    Acute sinusitis with symptoms > 10 days  - amoxicillin-clavulanate (AUGMENTIN) 875-125 MG tablet  Dispense: 14 tablet; Refill: 0     Patient Instructions   Tessalon as needed for cough  Augmentin twice a day for 7 days  Push fluids  Lots of handwashing.   Ibuprofen as needed for fever or pain  Delsym or dayquil/nyquil for cough as needed     Rest as able.   Will call if any other labs positive.    F/u in the clinic if symptoms persist or worsen.      Return in about 1 week (around 6/17/2024) for with regular provider if symptoms persist.    KIRTI Gill [facility] URGENT [facility]ARE MARGARITO Quevedo is a 43 year old male who presents to clinic today for the following health issues:  Chief Complaint   Patient presents with    Urgent Care     Cough x1 week - not getting better.  Felt feverish - sweating in 70 degree room.  No temp taken     HPI      URI Adult    Onset of symptoms was 10 day(s) ago.  Course of illness is worsening.    Severity moderate  Current and Associated symptoms: chills, sweats, runny nose, cough - productive, ear pain , sore throat, hoarse voice, and fatigue  Treatment measures tried include Tylenol/Ibuprofen, Fluids, and Rest.  Predisposing factors include None.      Review of Systems  Constitutional, HEENT, cardiovascular, pulmonary, GI, , musculoskeletal, neuro, skin, endocrine and psych systems are negative, except as otherwise noted.      Objective    /72   Pulse 71   Temp 98.1  F (36.7  C) (Temporal)   SpO2 98%   Physical Exam   GENERAL: alert and no distress  EYES: Eyes grossly normal to inspection, PERRL and conjunctivae and sclerae normal  HENT: normal cephalic/atraumatic, ear canals and TM's normal, nose and mouth without ulcers or lesions, oropharynx clear, oral mucous membranes moist, and sinuses:  maxillary, frontal tenderness on both sides  NECK: no adenopathy, no asymmetry, masses, or scars  RESP: lungs clear to auscultation - no rales, rhonchi or wheezes  CV: regular rate and rhythm, normal S1 S2, no S3 or S4, no murmur, click or rub, no peripheral edema  ABDOMEN: soft, nontender, no hepatosplenomegaly, no masses and bowel sounds normal  MS: no gross musculoskeletal defects noted, no edema

## 2024-06-10 NOTE — PROGRESS NOTES
Assessment & Plan     There are no diagnoses linked to this encounter.   {2021 E&M time (Optional):866417}    {Provider  Link to [facility] Help Grid :665898}    Return in about 1 week (around 6/17/2024) for with regular provider if symptoms persist.    Taylor Luther, APRSIN [facility]RG[facility] MARGARITO Quevedo is a 43 year old male who presents to clinic today for the following health issues:  Chief Complaint   Patient presents with    Urgent Care     Cough x1 week - not getting better.  Felt feverish - sweating in 70 degree room.  No temp taken     HPI    ***  {UC Conditions (Optional):751917}    Review of Systems  {ROS COMP (Optional):577737}      Objective    /72   Pulse 71   Temp 98.1  F (36.7  C) (Temporal)   SpO2 98%   Physical Exam   {Exam List (Optional):341604}    {Diagnostic Test Results (Optional):515384}

## 2024-06-25 ENCOUNTER — ANCILLARY PROCEDURE (OUTPATIENT)
Dept: ULTRASOUND IMAGING | Facility: CLINIC | Age: 44
End: 2024-06-25
Attending: FAMILY MEDICINE
Payer: COMMERCIAL

## 2024-06-25 DIAGNOSIS — Z15.09 MONOALLELIC MUTATION OF BAP1 GENE: ICD-10-CM

## 2024-06-25 PROCEDURE — 76700 US EXAM ABDOM COMPLETE: CPT

## 2024-07-07 ENCOUNTER — HEALTH MAINTENANCE LETTER (OUTPATIENT)
Age: 44
End: 2024-07-07

## 2024-07-10 ENCOUNTER — TELEPHONE (OUTPATIENT)
Dept: NEUROLOGY | Facility: CLINIC | Age: 44
End: 2024-07-10
Payer: COMMERCIAL

## 2025-03-10 ENCOUNTER — OFFICE VISIT (OUTPATIENT)
Dept: NEUROLOGY | Facility: CLINIC | Age: 45
End: 2025-03-10
Payer: COMMERCIAL

## 2025-03-10 ENCOUNTER — LAB (OUTPATIENT)
Dept: LAB | Facility: CLINIC | Age: 45
End: 2025-03-10
Payer: COMMERCIAL

## 2025-03-10 VITALS
HEIGHT: 72 IN | OXYGEN SATURATION: 100 % | WEIGHT: 179 LBS | RESPIRATION RATE: 16 BRPM | BODY MASS INDEX: 24.24 KG/M2 | DIASTOLIC BLOOD PRESSURE: 75 MMHG | HEART RATE: 53 BPM | SYSTOLIC BLOOD PRESSURE: 117 MMHG

## 2025-03-10 DIAGNOSIS — G40.309 GENERALIZED CONVULSIVE EPILEPSY (H): ICD-10-CM

## 2025-03-10 LAB
ALBUMIN SERPL BCG-MCNC: 4.7 G/DL (ref 3.5–5.2)
ALP SERPL-CCNC: 61 U/L (ref 40–150)
ALT SERPL W P-5'-P-CCNC: 15 U/L (ref 0–70)
ANION GAP SERPL CALCULATED.3IONS-SCNC: 10 MMOL/L (ref 7–15)
AST SERPL W P-5'-P-CCNC: 19 U/L (ref 0–45)
BILIRUB SERPL-MCNC: 0.7 MG/DL
BUN SERPL-MCNC: 16.1 MG/DL (ref 6–20)
CALCIUM SERPL-MCNC: 9.2 MG/DL (ref 8.8–10.4)
CHLORIDE SERPL-SCNC: 104 MMOL/L (ref 98–107)
CREAT SERPL-MCNC: 0.99 MG/DL (ref 0.67–1.17)
EGFRCR SERPLBLD CKD-EPI 2021: >90 ML/MIN/1.73M2
ERYTHROCYTE [DISTWIDTH] IN BLOOD BY AUTOMATED COUNT: 12.3 % (ref 10–15)
GLUCOSE SERPL-MCNC: 92 MG/DL (ref 70–99)
HCO3 SERPL-SCNC: 25 MMOL/L (ref 22–29)
HCT VFR BLD AUTO: 41.1 % (ref 40–53)
HGB BLD-MCNC: 14.1 G/DL (ref 13.3–17.7)
MCH RBC QN AUTO: 30.6 PG (ref 26.5–33)
MCHC RBC AUTO-ENTMCNC: 34.3 G/DL (ref 31.5–36.5)
MCV RBC AUTO: 89 FL (ref 78–100)
PLATELET # BLD AUTO: 208 10E3/UL (ref 150–450)
POTASSIUM SERPL-SCNC: 4.6 MMOL/L (ref 3.4–5.3)
PROT SERPL-MCNC: 7.1 G/DL (ref 6.4–8.3)
RBC # BLD AUTO: 4.61 10E6/UL (ref 4.4–5.9)
SODIUM SERPL-SCNC: 139 MMOL/L (ref 135–145)
WBC # BLD AUTO: 7.1 10E3/UL (ref 4–11)

## 2025-03-10 PROCEDURE — 80053 COMPREHEN METABOLIC PANEL: CPT | Performed by: PATHOLOGY

## 2025-03-10 PROCEDURE — 80177 DRUG SCRN QUAN LEVETIRACETAM: CPT | Performed by: INTERNAL MEDICINE

## 2025-03-10 PROCEDURE — 36415 COLL VENOUS BLD VENIPUNCTURE: CPT | Performed by: PATHOLOGY

## 2025-03-10 PROCEDURE — 99000 SPECIMEN HANDLING OFFICE-LAB: CPT | Performed by: PATHOLOGY

## 2025-03-10 PROCEDURE — 85027 COMPLETE CBC AUTOMATED: CPT | Performed by: PATHOLOGY

## 2025-03-10 RX ORDER — LEVETIRACETAM 500 MG/1
TABLET ORAL
Qty: 360 TABLET | Refills: 3 | Status: SHIPPED | OUTPATIENT
Start: 2025-03-10 | End: 2026-03-10

## 2025-03-10 ASSESSMENT — PAIN SCALES - GENERAL: PAINLEVEL_OUTOF10: NO PAIN (0)
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
## 2025-03-10 NOTE — NURSING NOTE
Assessment:     1  Anserine bursitis    2  Primary osteoarthritis of left knee          Plan:     Problem List Items Addressed This Visit     None      Visit Diagnoses     Anserine bursitis    -  Primary    Relevant Orders    XR knee 4+ vw left injury    Ambulatory referral to Physical Therapy    Primary osteoarthritis of left knee        Relevant Orders    Ambulatory referral to Physical Therapy            60-year-old female with left knee pain  Primary cause of her pain today is the anserine bursitis  I reviewed the radiographs and diagnosis with the patient  I recommended a cortisone injection in the pes bursa today  Please refer to the procedure note  Also discussed the importance of physical therapy for her  She should icing kidney moving as well as stretched out  She will follow up with me in the future as needed  Patient ID: Brennon Quintana is a 69 y o  female  Chief Complaint:  Left knee pain    HPI:      60-year-old female here today for pain in her left knee  This started for the last 2-3 weeks the pain has been fairly constant and worsening  She gets stabbing pain and has difficulty sleeping at night because of the stabbing pain  Sometimes it is more of an ache  She was get something done for the pain at this point  She has had a lot of swelling around the medial aspect of the proximal tibia  She cannot take anti-inflammatories because she is currently anticoagulated for PEs which were complications from [place]  She has not feeling any relief with icing  She has been using hyperkolater which helps a little bit  She has pain with weight-bearing  She has difficulty moving around and being as active as she would like  She was somewhat limited in her activities initially back in January when she was sick        Allergy:  Allergies   Allergen Reactions    Honey Tussin [Dextromethorphan] Anaphylaxis    Nuts Anaphylaxis     Almonds    Penicillins Itching    Shrimp Extract Allergy Skin Chief Complaint   Patient presents with    New Patient       Brandi Watt NRP       Test Anaphylaxis       Medications:  all current active meds have been reviewed    Past Medical History:  Past Medical History:   Diagnosis Date    Allergic     COVID-19     Hypertension     last assessed 5/4/15     Hypothyroid        Past Surgical History:  Past Surgical History:   Procedure Laterality Date    DILATION AND CURETTAGE OF UTERUS      GALLBLADDER SURGERY         Family History:  Family History   Problem Relation Age of Onset    Emphysema Mother     Emphysema Father        Social History:  Social History     Substance and Sexual Activity   Alcohol Use Not Currently    Alcohol/week: 0 0 standard drinks    Frequency: Never     Social History     Substance and Sexual Activity   Drug Use No     Social History     Tobacco Use   Smoking Status Never Smoker   Smokeless Tobacco Never Used           ROS:  Review of Systems   Musculoskeletal: Positive for arthralgias and joint swelling  All other systems reviewed and are negative  Objective:  BP Readings from Last 1 Encounters:   03/22/21 154/75      Wt Readings from Last 1 Encounters:   03/22/21 106 kg (233 lb)        BMI:   Estimated body mass index is 39 99 kg/m² as calculated from the following:    Height as of this encounter: 5' 4" (1 626 m)  Weight as of this encounter: 106 kg (233 lb)  EXAM:   Physical Exam  Musculoskeletal:      Left knee: She exhibits no effusion  Left Knee Exam     Tenderness   The patient is experiencing tenderness in the pes anserinus and medial joint line  Range of Motion   The patient has normal left knee ROM      Tests   Brandt:  Medial - negative Lateral - negative  Varus: negative Valgus: negative  Lachman:  Anterior - negative      Drawer:  Posterior - negative  Pivot shift: negative  Patellar apprehension: negative    Other   Erythema: absent  Sensation: normal  Pulse: present  Swelling: moderate  Effusion: no effusion present                Radiographs:  I have personally reviewed pertinent films in PACS and my interpretation is mild degenerative changes of knee joint, especially medially and patella  Large joint arthrocentesis: L pes anserine bursa  Universal Protocol:  Consent given by: patient  Time out: Immediately prior to procedure a "time out" was called to verify the correct patient, procedure, equipment, support staff and site/side marked as required    Supporting Documentation  Indications: pain   Procedure Details  Location: knee - L pes anserine bursa  Preparation: Patient was prepped and draped in the usual sterile fashion  Needle size: 22 G  Ultrasound guidance: no  Approach: superior  Medications administered: 6 mg betamethasone acetate-betamethasone sodium phosphate 6 (3-3) mg/mL; 5 mL lidocaine 1 %    Patient tolerance: patient tolerated the procedure well with no immediate complications  Dressing:  Sterile dressing applied

## 2025-03-11 LAB — LEVETIRACETAM SERPL-MCNC: 13.7 ÂΜG/ML (ref 10–40)

## 2025-07-13 ENCOUNTER — HEALTH MAINTENANCE LETTER (OUTPATIENT)
Age: 45
End: 2025-07-13